# Patient Record
Sex: FEMALE | Race: WHITE | NOT HISPANIC OR LATINO | Employment: FULL TIME | URBAN - METROPOLITAN AREA
[De-identification: names, ages, dates, MRNs, and addresses within clinical notes are randomized per-mention and may not be internally consistent; named-entity substitution may affect disease eponyms.]

---

## 2019-01-16 LAB
EXTERNAL HEPATITIS B SURFACE ANTIGEN: NEGATIVE
EXTERNAL HEPATITIS C AB: NEGATIVE
EXTERNAL RUBELLA QUALITATIVE: NORMAL
EXTERNAL SYPHILIS RPR SCREEN: NEGATIVE
HIV1 P24 AG SERPL QL IA: NEGATIVE

## 2019-07-29 LAB — EXTERNAL GROUP B STREP ANTIGEN: NORMAL

## 2019-08-14 ENCOUNTER — PREP FOR SURGERY (OUTPATIENT)
Dept: OTHER | Facility: HOSPITAL | Age: 24
End: 2019-08-14

## 2019-08-14 RX ORDER — METHYLERGONOVINE MALEATE 0.2 MG/ML
200 INJECTION INTRAVENOUS ONCE AS NEEDED
Status: CANCELLED | OUTPATIENT
Start: 2019-08-14

## 2019-08-14 RX ORDER — ONDANSETRON 2 MG/ML
4 INJECTION INTRAMUSCULAR; INTRAVENOUS EVERY 6 HOURS PRN
Status: CANCELLED | OUTPATIENT
Start: 2019-08-14

## 2019-08-14 RX ORDER — IBUPROFEN 600 MG/1
600 TABLET ORAL EVERY 6 HOURS PRN
Status: CANCELLED | OUTPATIENT
Start: 2019-08-14

## 2019-08-14 RX ORDER — SODIUM CHLORIDE 0.9 % (FLUSH) 0.9 %
3-10 SYRINGE (ML) INJECTION AS NEEDED
Status: CANCELLED | OUTPATIENT
Start: 2019-08-14

## 2019-08-14 RX ORDER — ONDANSETRON 4 MG/1
4 TABLET, FILM COATED ORAL EVERY 6 HOURS PRN
Status: CANCELLED | OUTPATIENT
Start: 2019-08-14

## 2019-08-14 RX ORDER — ACETAMINOPHEN 325 MG/1
650 TABLET ORAL EVERY 4 HOURS PRN
Status: CANCELLED | OUTPATIENT
Start: 2019-08-14

## 2019-08-14 RX ORDER — LIDOCAINE HYDROCHLORIDE 10 MG/ML
5 INJECTION, SOLUTION EPIDURAL; INFILTRATION; INTRACAUDAL; PERINEURAL AS NEEDED
Status: CANCELLED | OUTPATIENT
Start: 2019-08-14

## 2019-08-14 RX ORDER — SODIUM CHLORIDE, SODIUM LACTATE, POTASSIUM CHLORIDE, CALCIUM CHLORIDE 600; 310; 30; 20 MG/100ML; MG/100ML; MG/100ML; MG/100ML
125 INJECTION, SOLUTION INTRAVENOUS CONTINUOUS
Status: CANCELLED | OUTPATIENT
Start: 2019-08-14

## 2019-08-14 RX ORDER — OXYTOCIN-SODIUM CHLORIDE 0.9% IV SOLN 30 UNIT/500ML 30-0.9/5 UT/ML-%
125 SOLUTION INTRAVENOUS CONTINUOUS PRN
Status: CANCELLED | OUTPATIENT
Start: 2019-08-14

## 2019-08-14 RX ORDER — SODIUM CHLORIDE 0.9 % (FLUSH) 0.9 %
3 SYRINGE (ML) INJECTION EVERY 12 HOURS SCHEDULED
Status: CANCELLED | OUTPATIENT
Start: 2019-08-14

## 2019-08-14 RX ORDER — MORPHINE SULFATE 4 MG/ML
4 INJECTION, SOLUTION INTRAMUSCULAR; INTRAVENOUS
Status: CANCELLED | OUTPATIENT
Start: 2019-08-14 | End: 2019-08-24

## 2019-08-14 RX ORDER — OXYTOCIN-SODIUM CHLORIDE 0.9% IV SOLN 30 UNIT/500ML 30-0.9/5 UT/ML-%
2 SOLUTION INTRAVENOUS
Status: CANCELLED | OUTPATIENT
Start: 2019-08-14

## 2019-08-14 RX ORDER — CARBOPROST TROMETHAMINE 250 UG/ML
250 INJECTION, SOLUTION INTRAMUSCULAR AS NEEDED
Status: CANCELLED | OUTPATIENT
Start: 2019-08-14

## 2019-08-14 RX ORDER — OXYTOCIN-SODIUM CHLORIDE 0.9% IV SOLN 30 UNIT/500ML 30-0.9/5 UT/ML-%
250 SOLUTION INTRAVENOUS CONTINUOUS
Status: CANCELLED | OUTPATIENT
Start: 2019-08-14 | End: 2019-08-14

## 2019-08-14 RX ORDER — MISOPROSTOL 200 UG/1
800 TABLET ORAL AS NEEDED
Status: CANCELLED | OUTPATIENT
Start: 2019-08-14

## 2019-08-14 RX ORDER — OXYTOCIN-SODIUM CHLORIDE 0.9% IV SOLN 30 UNIT/500ML 30-0.9/5 UT/ML-%
999 SOLUTION INTRAVENOUS ONCE
Status: CANCELLED | OUTPATIENT
Start: 2019-08-14 | End: 2019-08-14

## 2019-08-16 ENCOUNTER — ANESTHESIA EVENT (OUTPATIENT)
Dept: LABOR AND DELIVERY | Facility: HOSPITAL | Age: 24
End: 2019-08-16

## 2019-08-16 ENCOUNTER — ANESTHESIA (OUTPATIENT)
Dept: LABOR AND DELIVERY | Facility: HOSPITAL | Age: 24
End: 2019-08-16

## 2019-08-16 ENCOUNTER — HOSPITAL ENCOUNTER (OUTPATIENT)
Dept: LABOR AND DELIVERY | Facility: HOSPITAL | Age: 24
Discharge: HOME | End: 2019-08-16

## 2019-08-16 ENCOUNTER — HOSPITAL ENCOUNTER (INPATIENT)
Facility: HOSPITAL | Age: 24
LOS: 2 days | Discharge: HOME | End: 2019-08-18
Attending: OBSTETRICS & GYNECOLOGY | Admitting: OBSTETRICS & GYNECOLOGY

## 2019-08-16 PROBLEM — Z34.90 ENCOUNTER FOR ELECTIVE INDUCTION OF LABOR: Status: ACTIVE | Noted: 2019-08-16

## 2019-08-16 LAB
ABO GROUP BLD: NORMAL
AMPHET+METHAMPHET UR QL: NEGATIVE
BARBITURATES UR QL SCN: NEGATIVE
BASOPHILS # BLD AUTO: 0.1 10*3/MM3 (ref 0–0.2)
BASOPHILS NFR BLD AUTO: 1 % (ref 0–1.5)
BENZODIAZ UR QL SCN: NEGATIVE
BLD GP AB SCN SERPL QL: NEGATIVE
CANNABINOIDS SERPL QL: NEGATIVE
COCAINE UR QL: NEGATIVE
CREAT UR-MCNC: 97.2 MG/DL
DEPRECATED RDW RBC AUTO: 48.6 FL (ref 37–54)
EOSINOPHIL # BLD AUTO: 0.1 10*3/MM3 (ref 0–0.4)
EOSINOPHIL NFR BLD AUTO: 0.8 % (ref 0.3–6.2)
ERYTHROCYTE [DISTWIDTH] IN BLOOD BY AUTOMATED COUNT: 17 % (ref 12.3–15.4)
HCT VFR BLD AUTO: 30.3 % (ref 34–46.6)
HGB BLD-MCNC: 9.8 G/DL (ref 12–15.9)
HIV1+2 AB SER QL: NORMAL
LYMPHOCYTES # BLD AUTO: 2.3 10*3/MM3 (ref 0.7–3.1)
LYMPHOCYTES NFR BLD AUTO: 19.9 % (ref 19.6–45.3)
MCH RBC QN AUTO: 26 PG (ref 26.6–33)
MCHC RBC AUTO-ENTMCNC: 32.4 G/DL (ref 31.5–35.7)
MCV RBC AUTO: 80.3 FL (ref 79–97)
METHADONE UR QL SCN: NEGATIVE
MONOCYTES # BLD AUTO: 0.5 10*3/MM3 (ref 0.1–0.9)
MONOCYTES NFR BLD AUTO: 4.7 % (ref 5–12)
NEUTROPHILS # BLD AUTO: 8.5 10*3/MM3 (ref 1.7–7)
NEUTROPHILS NFR BLD AUTO: 73.6 % (ref 42.7–76)
NRBC BLD AUTO-RTO: 0 /100 WBC (ref 0–0.2)
OPIATES UR QL: NEGATIVE
PCP UR QL SCN: NEGATIVE
PLATELET # BLD AUTO: 257 10*3/MM3 (ref 140–450)
PMV BLD AUTO: 8.1 FL (ref 6–12)
RBC # BLD AUTO: 3.77 10*6/MM3 (ref 3.77–5.28)
RH BLD: POSITIVE
T PALLIDUM IGG SER QL: NORMAL
T&S EXPIRATION DATE: NORMAL
WBC NRBC COR # BLD: 11.5 10*3/MM3 (ref 3.4–10.8)

## 2019-08-16 PROCEDURE — 80307 DRUG TEST PRSMV CHEM ANLYZR: CPT | Performed by: OBSTETRICS & GYNECOLOGY

## 2019-08-16 PROCEDURE — G0432 EIA HIV-1/HIV-2 SCREEN: HCPCS | Performed by: OBSTETRICS & GYNECOLOGY

## 2019-08-16 PROCEDURE — 86850 RBC ANTIBODY SCREEN: CPT | Performed by: OBSTETRICS & GYNECOLOGY

## 2019-08-16 PROCEDURE — 86780 TREPONEMA PALLIDUM: CPT | Performed by: OBSTETRICS & GYNECOLOGY

## 2019-08-16 PROCEDURE — 3E0E77Z INTRODUCTION OF ELECTROLYTIC AND WATER BALANCE SUBSTANCE INTO PRODUCTS OF CONCEPTION, VIA NATURAL OR ARTIFICIAL OPENING: ICD-10-PCS | Performed by: OBSTETRICS & GYNECOLOGY

## 2019-08-16 PROCEDURE — 0KQM0ZZ REPAIR PERINEUM MUSCLE, OPEN APPROACH: ICD-10-PCS | Performed by: OBSTETRICS & GYNECOLOGY

## 2019-08-16 PROCEDURE — 25010000002 MORPHINE PER 10 MG: Performed by: OBSTETRICS & GYNECOLOGY

## 2019-08-16 PROCEDURE — 3E033VJ INTRODUCTION OF OTHER HORMONE INTO PERIPHERAL VEIN, PERCUTANEOUS APPROACH: ICD-10-PCS | Performed by: OBSTETRICS & GYNECOLOGY

## 2019-08-16 PROCEDURE — 88307 TISSUE EXAM BY PATHOLOGIST: CPT | Performed by: OBSTETRICS & GYNECOLOGY

## 2019-08-16 PROCEDURE — C1755 CATHETER, INTRASPINAL: HCPCS | Performed by: STUDENT IN AN ORGANIZED HEALTH CARE EDUCATION/TRAINING PROGRAM

## 2019-08-16 PROCEDURE — 82570 ASSAY OF URINE CREATININE: CPT | Performed by: OBSTETRICS & GYNECOLOGY

## 2019-08-16 PROCEDURE — 86900 BLOOD TYPING SEROLOGIC ABO: CPT | Performed by: OBSTETRICS & GYNECOLOGY

## 2019-08-16 PROCEDURE — 85027 COMPLETE CBC AUTOMATED: CPT | Performed by: OBSTETRICS & GYNECOLOGY

## 2019-08-16 PROCEDURE — 86900 BLOOD TYPING SEROLOGIC ABO: CPT

## 2019-08-16 PROCEDURE — 86901 BLOOD TYPING SEROLOGIC RH(D): CPT

## 2019-08-16 PROCEDURE — 86901 BLOOD TYPING SEROLOGIC RH(D): CPT | Performed by: OBSTETRICS & GYNECOLOGY

## 2019-08-16 PROCEDURE — 25010000002 ONDANSETRON PER 1 MG: Performed by: OBSTETRICS & GYNECOLOGY

## 2019-08-16 PROCEDURE — 10907ZC DRAINAGE OF AMNIOTIC FLUID, THERAPEUTIC FROM PRODUCTS OF CONCEPTION, VIA NATURAL OR ARTIFICIAL OPENING: ICD-10-PCS | Performed by: OBSTETRICS & GYNECOLOGY

## 2019-08-16 PROCEDURE — 25010000002 PENICILLIN G POTASSIUM PER 600000 UNITS: Performed by: OBSTETRICS & GYNECOLOGY

## 2019-08-16 RX ORDER — SODIUM CHLORIDE 0.9 % (FLUSH) 0.9 %
3 SYRINGE (ML) INJECTION EVERY 12 HOURS SCHEDULED
Status: DISCONTINUED | OUTPATIENT
Start: 2019-08-16 | End: 2019-08-16

## 2019-08-16 RX ORDER — PRENATAL VIT/IRON FUM/FOLIC AC 27MG-0.8MG
1 TABLET ORAL DAILY
Status: DISCONTINUED | OUTPATIENT
Start: 2019-08-17 | End: 2019-08-18 | Stop reason: HOSPADM

## 2019-08-16 RX ORDER — METHYLERGONOVINE MALEATE 0.2 MG/ML
200 INJECTION INTRAVENOUS ONCE AS NEEDED
Status: DISCONTINUED | OUTPATIENT
Start: 2019-08-16 | End: 2019-08-16 | Stop reason: HOSPADM

## 2019-08-16 RX ORDER — ACETAMINOPHEN 325 MG/1
650 TABLET ORAL EVERY 4 HOURS PRN
Status: DISCONTINUED | OUTPATIENT
Start: 2019-08-16 | End: 2019-08-16

## 2019-08-16 RX ORDER — MORPHINE SULFATE 4 MG/ML
4 INJECTION, SOLUTION INTRAMUSCULAR; INTRAVENOUS
Status: DISCONTINUED | OUTPATIENT
Start: 2019-08-16 | End: 2019-08-16

## 2019-08-16 RX ORDER — ONDANSETRON 4 MG/1
4 TABLET, FILM COATED ORAL EVERY 8 HOURS PRN
Status: DISCONTINUED | OUTPATIENT
Start: 2019-08-16 | End: 2019-08-18 | Stop reason: HOSPADM

## 2019-08-16 RX ORDER — ONDANSETRON 4 MG/1
4 TABLET, FILM COATED ORAL EVERY 6 HOURS PRN
Status: DISCONTINUED | OUTPATIENT
Start: 2019-08-16 | End: 2019-08-16

## 2019-08-16 RX ORDER — MULTIPLE VITAMINS W/ MINERALS TAB 9MG-400MCG
1 TAB ORAL DAILY
COMMUNITY
End: 2020-11-23

## 2019-08-16 RX ORDER — SODIUM CHLORIDE 0.9 % (FLUSH) 0.9 %
3-10 SYRINGE (ML) INJECTION AS NEEDED
Status: DISCONTINUED | OUTPATIENT
Start: 2019-08-16 | End: 2019-08-16

## 2019-08-16 RX ORDER — OXYTOCIN-SODIUM CHLORIDE 0.9% IV SOLN 30 UNIT/500ML 30-0.9/5 UT/ML-%
250 SOLUTION INTRAVENOUS CONTINUOUS
Status: ACTIVE | OUTPATIENT
Start: 2019-08-16 | End: 2019-08-16

## 2019-08-16 RX ORDER — OXYTOCIN-SODIUM CHLORIDE 0.9% IV SOLN 30 UNIT/500ML 30-0.9/5 UT/ML-%
2 SOLUTION INTRAVENOUS
Status: DISCONTINUED | OUTPATIENT
Start: 2019-08-16 | End: 2019-08-16

## 2019-08-16 RX ORDER — LANOLIN 100 %
OINTMENT (GRAM) TOPICAL
Status: DISCONTINUED | OUTPATIENT
Start: 2019-08-16 | End: 2019-08-18 | Stop reason: HOSPADM

## 2019-08-16 RX ORDER — CALCIUM CARBONATE 200(500)MG
2 TABLET,CHEWABLE ORAL 3 TIMES DAILY PRN
Status: DISCONTINUED | OUTPATIENT
Start: 2019-08-16 | End: 2019-08-18 | Stop reason: HOSPADM

## 2019-08-16 RX ORDER — DOCUSATE SODIUM 100 MG/1
100 CAPSULE, LIQUID FILLED ORAL DAILY
Status: DISCONTINUED | OUTPATIENT
Start: 2019-08-17 | End: 2019-08-18 | Stop reason: HOSPADM

## 2019-08-16 RX ORDER — CARBOPROST TROMETHAMINE 250 UG/ML
250 INJECTION, SOLUTION INTRAMUSCULAR AS NEEDED
Status: DISCONTINUED | OUTPATIENT
Start: 2019-08-16 | End: 2019-08-16 | Stop reason: HOSPADM

## 2019-08-16 RX ORDER — OXYTOCIN-SODIUM CHLORIDE 0.9% IV SOLN 30 UNIT/500ML 30-0.9/5 UT/ML-%
125 SOLUTION INTRAVENOUS CONTINUOUS PRN
Status: DISCONTINUED | OUTPATIENT
Start: 2019-08-16 | End: 2019-08-16 | Stop reason: HOSPADM

## 2019-08-16 RX ORDER — SIMETHICONE 80 MG
80 TABLET,CHEWABLE ORAL 4 TIMES DAILY PRN
Status: DISCONTINUED | OUTPATIENT
Start: 2019-08-16 | End: 2019-08-16

## 2019-08-16 RX ORDER — IBUPROFEN 600 MG/1
600 TABLET ORAL EVERY 6 HOURS PRN
Status: DISCONTINUED | OUTPATIENT
Start: 2019-08-16 | End: 2019-08-18 | Stop reason: HOSPADM

## 2019-08-16 RX ORDER — IBUPROFEN 600 MG/1
600 TABLET ORAL EVERY 6 HOURS PRN
Status: DISCONTINUED | OUTPATIENT
Start: 2019-08-16 | End: 2019-08-16 | Stop reason: HOSPADM

## 2019-08-16 RX ORDER — LIDOCAINE HYDROCHLORIDE 10 MG/ML
5 INJECTION, SOLUTION EPIDURAL; INFILTRATION; INTRACAUDAL; PERINEURAL AS NEEDED
Status: DISCONTINUED | OUTPATIENT
Start: 2019-08-16 | End: 2019-08-16

## 2019-08-16 RX ORDER — ACETAMINOPHEN 325 MG/1
650 TABLET ORAL EVERY 4 HOURS PRN
Status: DISCONTINUED | OUTPATIENT
Start: 2019-08-16 | End: 2019-08-16 | Stop reason: HOSPADM

## 2019-08-16 RX ORDER — FERROUS SULFATE 325(65) MG
325 TABLET ORAL
COMMUNITY
End: 2020-11-23

## 2019-08-16 RX ORDER — MISOPROSTOL 200 UG/1
800 TABLET ORAL AS NEEDED
Status: DISCONTINUED | OUTPATIENT
Start: 2019-08-16 | End: 2019-08-16 | Stop reason: HOSPADM

## 2019-08-16 RX ORDER — ONDANSETRON 2 MG/ML
4 INJECTION INTRAMUSCULAR; INTRAVENOUS EVERY 6 HOURS PRN
Status: DISCONTINUED | OUTPATIENT
Start: 2019-08-16 | End: 2019-08-16 | Stop reason: SDUPTHER

## 2019-08-16 RX ORDER — OXYTOCIN-SODIUM CHLORIDE 0.9% IV SOLN 30 UNIT/500ML 30-0.9/5 UT/ML-%
999 SOLUTION INTRAVENOUS ONCE
Status: DISCONTINUED | OUTPATIENT
Start: 2019-08-16 | End: 2019-08-16 | Stop reason: HOSPADM

## 2019-08-16 RX ORDER — ONDANSETRON 2 MG/ML
4 INJECTION INTRAMUSCULAR; INTRAVENOUS EVERY 6 HOURS PRN
Status: DISCONTINUED | OUTPATIENT
Start: 2019-08-16 | End: 2019-08-16

## 2019-08-16 RX ORDER — ONDANSETRON 4 MG/1
4 TABLET, FILM COATED ORAL EVERY 6 HOURS PRN
Status: DISCONTINUED | OUTPATIENT
Start: 2019-08-16 | End: 2019-08-16 | Stop reason: SDUPTHER

## 2019-08-16 RX ORDER — SODIUM CHLORIDE 0.9 % (FLUSH) 0.9 %
1-10 SYRINGE (ML) INJECTION AS NEEDED
Status: DISCONTINUED | OUTPATIENT
Start: 2019-08-16 | End: 2019-08-18 | Stop reason: HOSPADM

## 2019-08-16 RX ORDER — SODIUM CHLORIDE, SODIUM LACTATE, POTASSIUM CHLORIDE, CALCIUM CHLORIDE 600; 310; 30; 20 MG/100ML; MG/100ML; MG/100ML; MG/100ML
125 INJECTION, SOLUTION INTRAVENOUS CONTINUOUS
Status: DISCONTINUED | OUTPATIENT
Start: 2019-08-16 | End: 2019-08-16

## 2019-08-16 RX ADMIN — LIDOCAINE HYDROCHLORIDE 3 ML: 10; .005 INJECTION, SOLUTION EPIDURAL; INFILTRATION; INTRACAUDAL; PERINEURAL at 10:36

## 2019-08-16 RX ADMIN — BENZOCAINE 57 SPRAY: 11.4 AEROSOL, SPRAY TOPICAL at 14:36

## 2019-08-16 RX ADMIN — WITCH HAZEL 1 PAD: 500 SOLUTION RECTAL; TOPICAL at 14:36

## 2019-08-16 RX ADMIN — IBUPROFEN 600 MG: 600 TABLET, FILM COATED ORAL at 19:05

## 2019-08-16 RX ADMIN — OXYTOCIN 2 MILLI-UNITS/MIN: 10 INJECTION INTRAVENOUS at 06:12

## 2019-08-16 RX ADMIN — SODIUM CHLORIDE 5 MILLION UNITS: 900 INJECTION, SOLUTION INTRAVENOUS at 06:11

## 2019-08-16 RX ADMIN — SODIUM CHLORIDE 2.5 MILLION UNITS: 900 INJECTION INTRAVENOUS at 09:55

## 2019-08-16 RX ADMIN — MORPHINE SULFATE 4 MG: 4 INJECTION INTRAVENOUS at 08:51

## 2019-08-16 RX ADMIN — Medication: at 14:36

## 2019-08-16 RX ADMIN — Medication 3 ML: at 10:15

## 2019-08-16 RX ADMIN — Medication 8 ML/HR: at 10:40

## 2019-08-16 RX ADMIN — ONDANSETRON 4 MG: 2 INJECTION INTRAMUSCULAR; INTRAVENOUS at 09:02

## 2019-08-16 RX ADMIN — SODIUM CHLORIDE, SODIUM LACTATE, POTASSIUM CHLORIDE, AND CALCIUM CHLORIDE 125 ML/HR: 600; 310; 30; 20 INJECTION, SOLUTION INTRAVENOUS at 06:11

## 2019-08-16 NOTE — ANESTHESIA PROCEDURE NOTES
Labor Epidural      Patient reassessed immediately prior to procedure    Patient location during procedure: OB  Start Time: 8/16/2019 10:26 AM  Indication:at surgeon's request  Performed By  Anesthesiologist: Joao Gunn MD  Preanesthetic Checklist  Completed: patient identified, site marked, surgical consent, pre-op evaluation, timeout performed, IV checked, risks and benefits discussed and monitors and equipment checked  Additional Notes  Tolerated well   Prep:  Pt Position:sitting  Sterile Tech:gloves, cap and sterile barrier  Prep:chlorhexidine gluconate and isopropyl alcohol  Monitoring:continuous pulse oximetry, EKG and blood pressure monitoring  Epidural Block Procedure:  Approach:midline  Guidance:landmark technique and palpation technique  Location:L3-L4  Needle Type:Tuohy  Needle Gauge:19 G  Loss of Resistance Medium: air  Loss of Resistance: 5cm  Cath Depth at skin:10 cm  Paresthesia: none  Aspiration:negative  Test Dose:negative  Number of Attempts: 1  Post Assessment:  Dressing:secured with tape and occlusive dressing applied  Pt Tolerance:patient tolerated the procedure well with no apparent complications  Complications:no

## 2019-08-16 NOTE — ANESTHESIA PREPROCEDURE EVALUATION
Anesthesia Evaluation     Patient summary reviewed and Nursing notes reviewed   no history of anesthetic complications:  NPO Solid Status: > 8 hours  NPO Liquid Status: > 2 hours           Airway   Mallampati: II  TM distance: >3 FB  Neck ROM: full  No difficulty expected  Dental - normal exam     Pulmonary - negative pulmonary ROS and normal exam   Cardiovascular - negative cardio ROS and normal exam        Neuro/Psych- negative ROS  GI/Hepatic/Renal/Endo - negative ROS     Musculoskeletal (-) negative ROS    Abdominal  - normal exam   Substance History - negative use     OB/GYN    (+) Pregnant,         Other - negative ROS                       Anesthesia Plan    ASA 2     epidural     Anesthetic plan, all risks, benefits, and alternatives have been provided, discussed and informed consent has been obtained with: patient.

## 2019-08-16 NOTE — H&P
ALLEGRA Mckeon  Obstetric History and Physical     Chief Complaint: IOL at term    Subjective     Patient is a 24 y.o. female  currently at 39w1d, who presents for IOL at term.    Her prenatal care is c/b lapse in [facility] (moved to [place] then moved back), MJ use, anemia GBS positive.      Prenatal Information:  External Prenatal Results     Pregnancy Outside Results - Transcribed From Office Records - See Scanned Records For Details     Test Value Date Time    Hgb 9.8 g/dL 19 0625    Hct 30.3 % 19 0625    ABO A  19 0625    Rh Positive  19 0625    Antibody Screen Negative  19 0625    Glucose Fasting GTT       Glucose Tolerance Test 1 hour       Glucose Tolerance Test 3 hour       Gonorrhea (discrete)       Chlamydia (discrete)       RPR Negative  19     VDRL       Syphilis Antibody       Rubella Immune  19     HBsAg Negative  19     Herpes Simplex Virus PCR       Herpes Simplex VIrus Culture       HIV Negative  19     Hep C RNA Quant PCR       Hep C Antibody Negative  19     AFP       Group B Strep POS  19     GBS Susceptibility to Clindamycin       GBS Susceptibility to Erythromycin       Fetal Fibronectin       Genetic Testing, Maternal Blood             Drug Screening     Test Value Date Time    Urine Drug Screen       Amphetamine Screen Negative  19 0537    Barbiturate Screen Negative  19 0537    Benzodiazepine Screen Negative  19 0537    Methadone Screen Negative  19 0537    Phencyclidine Screen Negative  19 0537    Opiates Screen Negative  19 0537    THC Screen Negative  19 0537    Cocaine Screen       Propoxyphene Screen       Buprenorphine Screen       Methamphetamine Screen       Oxycodone Screen       Tricyclic Antidepressants Screen                    Past OB History:    : SAB   G2: 10/9/16-TSVD, 7-14, no comp         Past Medical History: Past Medical History:   Diagnosis Date   • Chlamydia     • Substance abuse (CMS/[facility])     vinayuna use   • Trauma         Past Surgical History History reviewed. No pertinent surgical history.     Family History: No family history on file.   Social History:  reports that she quit smoking about 7 months ago. She has never used smokeless tobacco.   reports that she does not drink alcohol.   reports that she uses drugs. Drug: Marijuana.        General ROS: Pertinent items are noted in HPI    Objective      Vitals:     Vitals:    08/16/19 0700 08/16/19 0730 08/16/19 0800 08/16/19 0830   BP: 101/50 91/55 96/66 102/79   Pulse: 90 86 88 85   Resp:       Temp:       TempSrc:       SpO2:       Weight:       Height:           Fetal Heart Rate Assessment:   120, mod variability    [place]:   Every 2-3 min     Physical Exam:     General Appearance:    Alert, cooperative, in no acute distress   Abdomen:     Soft, non-tender, EFW 7 1/2 lbs   Pelvic Exam:    Presentation: vtx    Cervix: 2/75/-1, pelvis clinically adequate, proven to 7-14   Extremities:   Moves all extremities well   Skin:   No bleeding, bruising or rash         Laboratory Results:   Lab Results (last 48 hours)     Procedure Component Value Units Date/Time    CBC & Differential [[id number]] Collected:  08/16/19 0625    Specimen:  Blood Updated:  08/16/19 0647    Narrative:       The following orders were created for panel order CBC & Differential.  Procedure                               Abnormality         Status                     ---------                               -----------         ------                     Manual Differential[[id number]]                                                         CBC Auto Differential[[id number]]        Abnormal            Final result                 Please view results for these tests on the individual orders.    CBC Auto Differential [[id number]]  (Abnormal) Collected:  08/16/19 0625    Specimen:  Blood Updated:  08/16/19 0647     WBC 11.50 10*3/mm3      RBC 3.77 10*6/mm3       Hemoglobin 9.8 g/dL      Hematocrit 30.3 %      MCV 80.3 fL      MCH 26.0 pg      MCHC 32.4 g/dL      RDW 17.0 %      RDW-SD 48.6 fl      MPV 8.1 fL      Platelets 257 10*3/mm3      Neutrophil % 73.6 %      Lymphocyte % 19.9 %      Monocyte % 4.7 %      Eosinophil % 0.8 %      Basophil % 1.0 %      Neutrophils, Absolute 8.50 10*3/mm3      Lymphocytes, Absolute 2.30 10*3/mm3      Monocytes, Absolute 0.50 10*3/mm3      Eosinophils, Absolute 0.10 10*3/mm3      Basophils, Absolute 0.10 10*3/mm3      nRBC 0.0 /100 WBC     Rubella Antibody, IgG [[id number]] Resulted:  01/16/19     Specimen:  Blood Updated:  08/16/19 0639     External Rubella Qual Immune    Hepatitis C Antibody [[id number]] Resulted:  01/16/19     Specimen:  Blood Updated:  08/16/19 0639     External Hepatitis C Ab Negative    Hepatitis B Surface Antigen [[id number]] Resulted:  01/16/19     Specimen:  Blood Updated:  08/16/19 0639     External Hepatitis B Surface Ag Negative    RPR [[id number]] Resulted:  01/16/19     Specimen:  Blood Updated:  08/16/19 0639     External RPR Negative    HIV-1 Antibody, EIA [[id number]] Resulted:  01/16/19     Specimen:  Blood Updated:  08/16/19 0639     External HIV Antibody Negative    Group B Streptococcus Culture - Swab, Vaginal/Rectum [[id number]] Resulted:  07/29/19     Specimen:  Swab from Vaginal/Rectum Updated:  08/16/19 0638     External Strep Group B Ag POS    HIV-1 & HIV-2 Antibodies [[id number]] Collected:  08/16/19 0625    Specimen:  Blood Updated:  08/16/19 0633    Narrative:       The following orders were created for panel order HIV-1 & HIV-2 Antibodies.  Procedure                               Abnormality         Status                     ---------                               -----------         ------                     HIV-1 & HIV-2 Antibodies[[id number]]                         In process                   Please view results for these tests on the individual orders.    HIV-1 & HIV-2 Antibodies [[id number]]  Collected:  08/16/19 0625    Specimen:  Blood Updated:  08/16/19 0633    T Pallidum Antibody (FTA-Ab) [[id number]] Collected:  08/16/19 0625    Specimen:  Blood Updated:  08/16/19 0633    Urine Drug Screen - [[id number]] Collected:  08/16/19 0537    Specimen:  Urine Updated:  08/16/19 0623     Barbiturates Screen, Urine Negative     Benzodiazepine Screen, Urine Negative     Cocaine Screen, Urine Negative     Opiate Screen Negative     THC, Screen, Urine Negative     Methadone Screen, Urine Negative     Amphetamine Screen, Urine Negative     Creatinine, Urine 97.2 mg/dL      Phencyclidine (PCP), Urine Negative    Narrative:       All urine drugs of abuse requests without chain of custody are for medical screening purposes only.  False positives are possible.               Assessment/Plan     Active Problems:    Encounter for elective induction of labor         Assessment:  1.  Intrauterine pregnancy at 39w1d gestation with reassuring fetal status.    2.  IOL at term  3.  GBS status:   External Strep Group B Ag   Date Value Ref Range Status   07/29/2019 POS  Final       Plan:  1. Vaginal anticipated  2. Plan of care has been reviewed with patient.  3.  Risks, benefits of treatment plan have been discussed.  4.  All questions have been answered.  5.  PCN G for GBS prophylaxis       Veronica Smith MD   8/16/2019   9:03 AM

## 2019-08-16 NOTE — PLAN OF CARE
Problem: Patient Care Overview  Goal: Plan of Care Review   08/16/19 0659   Coping/Psychosocial   Plan of Care Reviewed With patient;spouse   OTHER   Outcome Summary Pt comfortable at this time. Family at bedside       Problem: Labor (Cervical Ripen, Induct, Augment) (Adult,Obstetrics,Pediatric)  Goal: Signs and Symptoms of Listed Potential Problems Will be Absent, Minimized or Managed (Labor)  Outcome: Ongoing (interventions implemented as appropriate)

## 2019-08-16 NOTE — PROGRESS NOTES
" Mike  Obstetric Progress Note    Subjective   Having painful contractions    Objective     Vitals:  Vitals:    08/16/19 0730 08/16/19 0800 08/16/19 0830 08/16/19 0900   BP: 91/55 96/66 102/79 110/69   Pulse: 86 88 85 93   Resp:       Temp:       TempSrc:       SpO2:       Weight:       Height:         Flowsheet Rows      First Filed Value   Admission Height  167.6 cm (66\") Documented at 08/16/2019 0652   Admission Weight  88.8 kg (195 lb 12.3 oz) Documented at 08/16/2019 0652        No intake or output data in the 24 hours ending 08/16/19 1006    Fetal Heart Rate Assessment:   125, mod variability  [place]:  Every 2-3 min    Physical Exam:  General: Patient is in no acute distress    Pelvic Exam: 4/90/-1, AROM, thin meconium, IUPC and FSE placed without difficulty            Assessment/Plan     Active Problems:    Encounter for elective induction of labor         Assessment:  1.  Intrauterine pregnancy at 39w1d gestation with reactive fetal status.    2.  labor  with ROM  3.  Thin meconium  4.  GBS status:   External Strep Group B Ag   Date Value Ref Range Status   07/29/2019 POS  Final       Plan:  1. Vaginal anticipated  2. Amnioinfusion 500 cc      Veronica Smith MD  8/16/2019  10:06 AM      "

## 2019-08-16 NOTE — L&D DELIVERY NOTE
Mike  Vaginal Delivery Note    Pre-delivery diagnosis     1. 24 y.o.  at 39w1d  2. IOL at term  3. Thin meconium    Post-delivery diagnosis  Same      Delivery     Delivery:  Spontaneous Vaginal Delivery    Date of Delivery:  2019   Anesthesia: epidural    Delivering clinician: Veronica Smith MD      Patient presented for IOL at term. Her cervix was 3 cm dilated. She was started on pitocin and had an AROM after antibiotics were administered for GBS prophylaxis. There was thin meconium. She had an amnioinfusion. An epidural was placed. She progressed to C/C/+2.    She pushed with one contraction with excellent maternal effort. The head delivered in OA, then the shoulders and remainder delivered without difficulty. L shoulder anterior. The mouth and nose were suctioned. There was good cry, color, tone and movement of all extremities. The infant was placed on the mother's chest and abdomen after the cord was clamped and cut (short cord). Cord gasses and blood were drawn. The placenta delivered spontaneously, intact and with a 3 vessel cord. The uterus, cervix and vagina was explored. There was a small 2nd degree midline laceration. Repaired with 3.0 monocryl. Good cosmesis and hemostasis noted. Sponge and needle counts correct. The patient and infant were left to recover in L&D.    Infant    Findings: female  infant       Apgars:   6   @ 1 minute /   9   @ 5 minutes         VB.356/-3.7    Placenta, Cord, and Fluid    Placenta delivered  spontaneous  3VC          Lacerations       2nd degree     Estimated Blood Loss 350 mL     Complications  none    Disposition  Mother to Mother Baby/Postpartum  in stable condition.  Baby remains with mom  in stable condition.      Veronica Smith MD  19  12:47 PM

## 2019-08-17 LAB
BASOPHILS # BLD AUTO: 0.1 10*3/MM3 (ref 0–0.2)
BASOPHILS NFR BLD AUTO: 0.4 % (ref 0–1.5)
DEPRECATED RDW RBC AUTO: 46.8 FL (ref 37–54)
EOSINOPHIL # BLD AUTO: 0.1 10*3/MM3 (ref 0–0.4)
EOSINOPHIL NFR BLD AUTO: 0.5 % (ref 0.3–6.2)
ERYTHROCYTE [DISTWIDTH] IN BLOOD BY AUTOMATED COUNT: 16.5 % (ref 12.3–15.4)
HCT VFR BLD AUTO: 28.1 % (ref 34–46.6)
HGB BLD-MCNC: 9 G/DL (ref 12–15.9)
LYMPHOCYTES # BLD AUTO: 2.3 10*3/MM3 (ref 0.7–3.1)
LYMPHOCYTES NFR BLD AUTO: 19.5 % (ref 19.6–45.3)
MCH RBC QN AUTO: 25.7 PG (ref 26.6–33)
MCHC RBC AUTO-ENTMCNC: 32.2 G/DL (ref 31.5–35.7)
MCV RBC AUTO: 79.7 FL (ref 79–97)
MONOCYTES # BLD AUTO: 0.7 10*3/MM3 (ref 0.1–0.9)
MONOCYTES NFR BLD AUTO: 5.6 % (ref 5–12)
NEUTROPHILS # BLD AUTO: 8.7 10*3/MM3 (ref 1.7–7)
NEUTROPHILS NFR BLD AUTO: 74 % (ref 42.7–76)
NRBC BLD AUTO-RTO: 0 /100 WBC (ref 0–0.2)
PLATELET # BLD AUTO: 220 10*3/MM3 (ref 140–450)
PMV BLD AUTO: 8.5 FL (ref 6–12)
RBC # BLD AUTO: 3.52 10*6/MM3 (ref 3.77–5.28)
WBC NRBC COR # BLD: 11.8 10*3/MM3 (ref 3.4–10.8)

## 2019-08-17 PROCEDURE — 85025 COMPLETE CBC W/AUTO DIFF WBC: CPT | Performed by: OBSTETRICS & GYNECOLOGY

## 2019-08-17 PROCEDURE — 90715 TDAP VACCINE 7 YRS/> IM: CPT | Performed by: OBSTETRICS & GYNECOLOGY

## 2019-08-17 PROCEDURE — 90471 IMMUNIZATION ADMIN: CPT | Performed by: OBSTETRICS & GYNECOLOGY

## 2019-08-17 PROCEDURE — 25010000002 TDAP 5-2.5-18.5 LF-MCG/0.5 SUSPENSION: Performed by: OBSTETRICS & GYNECOLOGY

## 2019-08-17 RX ORDER — HYDROCODONE BITARTRATE AND ACETAMINOPHEN 5; 325 MG/1; MG/1
1 TABLET ORAL EVERY 4 HOURS PRN
Status: DISCONTINUED | OUTPATIENT
Start: 2019-08-17 | End: 2019-08-18 | Stop reason: HOSPADM

## 2019-08-17 RX ADMIN — PRENATAL VIT W/ FE FUMARATE-FA TAB 27-0.8 MG 1 TABLET: 27-0.8 TAB at 09:57

## 2019-08-17 RX ADMIN — DOCUSATE SODIUM 100 MG: 100 CAPSULE, LIQUID FILLED ORAL at 09:57

## 2019-08-17 RX ADMIN — TETANUS TOXOID, REDUCED DIPHTHERIA TOXOID AND ACELLULAR PERTUSSIS VACCINE, ADSORBED 0.5 ML: 5; 2.5; 8; 8; 2.5 SUSPENSION INTRAMUSCULAR at 12:59

## 2019-08-17 RX ADMIN — IBUPROFEN 600 MG: 600 TABLET, FILM COATED ORAL at 01:04

## 2019-08-17 RX ADMIN — IBUPROFEN 600 MG: 600 TABLET, FILM COATED ORAL at 19:53

## 2019-08-17 RX ADMIN — HYDROCODONE BITARTRATE AND ACETAMINOPHEN 1 TABLET: 5; 325 TABLET ORAL at 16:47

## 2019-08-17 RX ADMIN — IBUPROFEN 600 MG: 600 TABLET, FILM COATED ORAL at 07:05

## 2019-08-17 RX ADMIN — IBUPROFEN 600 MG: 600 TABLET, FILM COATED ORAL at 12:59

## 2019-08-17 NOTE — PROGRESS NOTES
ALLEGRA Mckeon  Postpartum Note    Subjective   Postpartum Day 1:  Spontaneous Vaginal Delivery    Patient without complaints. Her pain is well controlled with nonsteroidal anti-inflammatory drugs. She is ambulating well.  Patient describes her bleeding as thin lochia.    Breastfeeding: without difficulty.    Objective     Vitals:  Vitals:    08/16/19 2305 08/17/19 0300 08/17/19 0721 08/17/19 1110   BP: (!) 87/51 100/62 100/65 101/53   BP Location: Left arm Left arm     Patient Position: Lying Lying     Pulse: 74 65 70 76   Resp: 16 16 16 16   Temp: 98.7 °F (37.1 °C) 97.7 °F (36.5 °C) 98.2 °F (36.8 °C) 98.7 °F (37.1 °C)   TempSrc: Oral Oral     SpO2: 99% 98% 98% 97%   Weight:       Height:           Physical Exam:  General:  no acute distress.  Abdomen: abdomen is soft without significant tenderness, masses, organomegaly or guarding. Fundus: appropriate, firm, non tender  Incision: N/C  Extremities: normal,  trace edema.     Labs:  Lab Results (last 72 hours)     Procedure Component Value Units Date/Time    CBC & Differential [[id number]] Collected:  08/17/19 0522    Specimen:  Blood Updated:  08/17/19 0619    Narrative:       The following orders were created for panel order CBC & Differential.  Procedure                               Abnormality         Status                     ---------                               -----------         ------                     CBC Auto Differential[[id number]]        Abnormal            Final result                 Please view results for these tests on the individual orders.    CBC Auto Differential [[id number]]  (Abnormal) Collected:  08/17/19 0522    Specimen:  Blood Updated:  08/17/19 0619     WBC 11.80 10*3/mm3      RBC 3.52 10*6/mm3      Hemoglobin 9.0 g/dL      Hematocrit 28.1 %      MCV 79.7 fL      MCH 25.7 pg      MCHC 32.2 g/dL      RDW 16.5 %      RDW-SD 46.8 fl      MPV 8.5 fL      Platelets 220 10*3/mm3      Neutrophil % 74.0 %      Lymphocyte % 19.5 %      Monocyte %  5.6 %      Eosinophil % 0.5 %      Basophil % 0.4 %      Neutrophils, Absolute 8.70 10*3/mm3      Lymphocytes, Absolute 2.30 10*3/mm3      Monocytes, Absolute 0.70 10*3/mm3      Eosinophils, Absolute 0.10 10*3/mm3      Basophils, Absolute 0.10 10*3/mm3      nRBC 0.0 /100 WBC     Tissue Pathology Exam [[id number]] Updated:  08/17/19 0504    Specimen:  Tissue from Placenta     HIV-1 & HIV-2 Antibodies [[id number]] Collected:  08/16/19 0625    Specimen:  Blood Updated:  08/16/19 1107    Narrative:       The following orders were created for panel order HIV-1 & HIV-2 Antibodies.  Procedure                               Abnormality         Status                     ---------                               -----------         ------                     HIV-1 & HIV-2 Antibodies[[id number]]     Normal              Final result                 Please view results for these tests on the individual orders.    HIV-1 & HIV-2 Antibodies [[id number]]  (Normal) Collected:  08/16/19 0625    Specimen:  Blood Updated:  08/16/19 1107     HIV-1/ HIV-2 Non-Reactive     Comment: A non-reactive test result does not preclude the possibility of exposure to HIV or infection with HIV. An antibody response to recent exposure may take several months to reach detectable levels.       Narrative:       The HIV antibody/antigen combo assay is a qualitative assay for HIV that includes the p24 antigen as well as antibodies to HIV types 1 and 2. This test is intended to be used as a screening assay in the diagnosis of HIV infection in patients over the age of 2.    T Pallidum Antibody (FTA-Ab) [[id number]]  (Normal) Collected:  08/16/19 0625    Specimen:  Blood Updated:  08/16/19 1037     TREPONEMAL AB IGG Non-Reactive    CBC & Differential [[id number]] Collected:  08/16/19 0625    Specimen:  Blood Updated:  08/16/19 0647    Narrative:       The following orders were created for panel order CBC & Differential.  Procedure                                Abnormality         Status                     ---------                               -----------         ------                     Manual Differential[[id number]]                                                         CBC Auto Differential[[id number]]        Abnormal            Final result                 Please view results for these tests on the individual orders.    CBC Auto Differential [[id number]]  (Abnormal) Collected:  08/16/19 0625    Specimen:  Blood Updated:  08/16/19 0647     WBC 11.50 10*3/mm3      RBC 3.77 10*6/mm3      Hemoglobin 9.8 g/dL      Hematocrit 30.3 %      MCV 80.3 fL      MCH 26.0 pg      MCHC 32.4 g/dL      RDW 17.0 %      RDW-SD 48.6 fl      MPV 8.1 fL      Platelets 257 10*3/mm3      Neutrophil % 73.6 %      Lymphocyte % 19.9 %      Monocyte % 4.7 %      Eosinophil % 0.8 %      Basophil % 1.0 %      Neutrophils, Absolute 8.50 10*3/mm3      Lymphocytes, Absolute 2.30 10*3/mm3      Monocytes, Absolute 0.50 10*3/mm3      Eosinophils, Absolute 0.10 10*3/mm3      Basophils, Absolute 0.10 10*3/mm3      nRBC 0.0 /100 WBC     Rubella Antibody, IgG [[id number]] Resulted:  01/16/19     Specimen:  Blood Updated:  08/16/19 0639     External Rubella Qual Immune    Hepatitis C Antibody [[id number]] Resulted:  01/16/19     Specimen:  Blood Updated:  08/16/19 0639     External Hepatitis C Ab Negative    Hepatitis B Surface Antigen [[id number]] Resulted:  01/16/19     Specimen:  Blood Updated:  08/16/19 0639     External Hepatitis B Surface Ag Negative    RPR [[id number]] Resulted:  01/16/19     Specimen:  Blood Updated:  08/16/19 0639     External RPR Negative    HIV-1 Antibody, EIA [[id number]] Resulted:  01/16/19     Specimen:  Blood Updated:  08/16/19 0639     External HIV Antibody Negative    Group B Streptococcus Culture - Swab, Vaginal/Rectum [[id number]] Resulted:  07/29/19     Specimen:  Swab from Vaginal/Rectum Updated:  08/16/19 0638     External Strep Group B Ag POS    Urine Drug Screen -  [[id number]] Collected:  08/16/19 0537    Specimen:  Urine Updated:  08/16/19 0623     Barbiturates Screen, Urine Negative     Benzodiazepine Screen, Urine Negative     Cocaine Screen, Urine Negative     Opiate Screen Negative     THC, Screen, Urine Negative     Methadone Screen, Urine Negative     Amphetamine Screen, Urine Negative     Creatinine, Urine 97.2 mg/dL      Phencyclidine (PCP), Urine Negative    Narrative:       All urine drugs of abuse requests without chain of custody are for medical screening purposes only.  False positives are possible.             Feeding method: Breastfeeding Status: Yes     Blood Type: RH Positive        Assessment/Plan     Active Problems:    Encounter for elective induction of labor      Jewell Brayan is Day 1  post-partum from a  Spontaneous Vaginal Delivery      Plan:  Continue current care.      Kailey Hoskins MD  8/17/2019  1:01 PM

## 2019-08-17 NOTE — PLAN OF CARE
Problem: Patient Care Overview  Goal: Plan of Care Review  Outcome: Ongoing (interventions implemented as appropriate)   08/17/19 6913   Coping/Psychosocial   Plan of Care Reviewed With patient   OTHER   Outcome Summary Patient is controlling pain with Motrin, ambulating well, and reports infant is breastfeeing well. Family is present at bedside and attentive to patient.   Plan of Care Review   Progress improving       Problem: Postpartum (Vaginal Delivery) (Adult,Obstetrics,Pediatric)  Goal: Signs and Symptoms of Listed Potential Problems Will be Absent, Minimized or Managed (Postpartum)  Outcome: Ongoing (interventions implemented as appropriate)

## 2019-08-17 NOTE — LACTATION NOTE
This note was copied from a baby's chart.  Provided mother with handouts, and hydrogel pads. Basic teaching done. Denies history of breast surgery. Denies use of routine medications. Denies wool allergy. Has a Spectra breastpump and a Motiv. Uses WIC in [facility]. Mother states she  1st child [id number], then it was too sore to breastfeed, she pumped and fed for 7mos. She did find out that her first child had a tongue and upper lip tie. Currently watching the breastfeeding DVD. Encouraged to call for feeding observation.

## 2019-08-17 NOTE — PLAN OF CARE
Problem: Patient Care Overview  Goal: Individualization and Mutuality  Outcome: Ongoing (interventions implemented as appropriate)      Problem: Postpartum (Vaginal Delivery) (Adult,Obstetrics,Pediatric)  Goal: Signs and Symptoms of Listed Potential Problems Will be Absent, Minimized or Managed (Postpartum)  Outcome: Ongoing (interventions implemented as appropriate)

## 2019-08-18 VITALS
BODY MASS INDEX: 31.46 KG/M2 | HEIGHT: 66 IN | DIASTOLIC BLOOD PRESSURE: 69 MMHG | OXYGEN SATURATION: 96 % | HEART RATE: 66 BPM | RESPIRATION RATE: 16 BRPM | TEMPERATURE: 98.3 F | WEIGHT: 195.77 LBS | SYSTOLIC BLOOD PRESSURE: 106 MMHG

## 2019-08-18 PROBLEM — Z34.90 ENCOUNTER FOR ELECTIVE INDUCTION OF LABOR: Status: RESOLVED | Noted: 2019-08-16 | Resolved: 2019-08-18

## 2019-08-18 RX ORDER — HYDROCODONE BITARTRATE AND ACETAMINOPHEN 5; 325 MG/1; MG/1
1 TABLET ORAL EVERY 4 HOURS PRN
Qty: 18 TABLET | Refills: 0 | Status: SHIPPED | OUTPATIENT
Start: 2019-08-18 | End: 2019-08-27

## 2019-08-18 RX ORDER — IBUPROFEN 600 MG/1
600 TABLET ORAL EVERY 6 HOURS PRN
Qty: 30 TABLET | Refills: 1 | Status: SHIPPED | OUTPATIENT
Start: 2019-08-18 | End: 2020-11-23

## 2019-08-18 RX ADMIN — HYDROCODONE BITARTRATE AND ACETAMINOPHEN 1 TABLET: 5; 325 TABLET ORAL at 02:01

## 2019-08-18 RX ADMIN — PRENATAL VIT W/ FE FUMARATE-FA TAB 27-0.8 MG 1 TABLET: 27-0.8 TAB at 09:44

## 2019-08-18 RX ADMIN — IBUPROFEN 600 MG: 600 TABLET, FILM COATED ORAL at 13:10

## 2019-08-18 RX ADMIN — IBUPROFEN 600 MG: 600 TABLET, FILM COATED ORAL at 05:41

## 2019-08-18 RX ADMIN — DOCUSATE SODIUM 100 MG: 100 CAPSULE, LIQUID FILLED ORAL at 09:00

## 2019-08-18 RX ADMIN — HYDROCODONE BITARTRATE AND ACETAMINOPHEN 1 TABLET: 5; 325 TABLET ORAL at 13:10

## 2019-08-18 RX ADMIN — WITCH HAZEL 1 PAD: 500 SOLUTION RECTAL; TOPICAL at 09:45

## 2019-08-18 NOTE — PLAN OF CARE
Problem: Patient Care Overview  Goal: Plan of Care Review  Outcome: Ongoing (interventions implemented as appropriate)   08/18/19 0456   Coping/Psychosocial   Plan of Care Reviewed With patient   OTHER   Outcome Summary Patient reports that pain is better controlled now that she has been taking the Norco. Patient was assisted with the set up of a sitz bath and patient performed sitz while infant was being assessed. Family is present and attentive to patient and her needs. Patient reports that breastfeeding is going well.   Plan of Care Review   Progress improving       Problem: Postpartum (Vaginal Delivery) (Adult,Obstetrics,Pediatric)  Goal: Signs and Symptoms of Listed Potential Problems Will be Absent, Minimized or Managed (Postpartum)  Outcome: Ongoing (interventions implemented as appropriate)  Patient was assisted with setting up a sitz bath today to help provide comfort.  Patient is also taking pain medication for pain, and reports better results with the change in pain medication.   08/18/19 0454   Goal/Outcome Evaluation   Problems Assessed (Postpartum Vaginal Delivery) all   Problems Present (Postpartum Vag Deliv) pain

## 2019-08-18 NOTE — LACTATION NOTE
This note was copied from a baby's chart.  Mother reports feedings are going very well. Feeding observation by Sandy ROMO yesterday. Declines feeding observation today, verbalizes happiness with how well feedings are going. Breasts filling. Nipples nontender. States she has read thru info packet. Denies lactation needs at this time. Provided with 's discharge weight ticket and lactation contact card. Encouraged to call as needed.

## 2019-08-18 NOTE — DISCHARGE SUMMARY
[facility]  Delivery Discharge Summary    Primary OB Clinician: Veronica Smith MD    Admission Diagnosis:  Active Problems:    * No active hospital problems. *  1.  T IUP  2.  Desires elective induction    Discharge Diagnosis:  Same    Gestational Age: 39w1d    Date of Delivery: 2019     Delivered By:  Veronica Smith     Delivery Type: Vaginal, Spontaneous      Tubal Ligation: n/a    Intrapartum Course: Uncomplicated delivery.     Postpartum Course:  Uncomplicated pp course.  Patient requiring Norco for pain relief.  She is also taking Motrin.  She is breast-feeding well.  She is afebrile and tolerating a regular diet.  She is anemic so she will be continued on iron twice daily.  To follow-up in 6 weeks or as needed    Physical Exam:    Vitals:   Vitals:    19 1110 19 1520 19 2213 19 0725   BP: 101/53 94/59 98/62 106/69   BP Location:   Left arm    Patient Position:   Lying    Pulse: 76 65 69 66   Resp: 16 16 14 16   Temp: 98.7 °F (37.1 °C) 97.6 °F (36.4 °C) 97.9 °F (36.6 °C) 98.3 °F (36.8 °C)   TempSrc:   Oral    SpO2: 97% 99% 98% 96%   Weight:       Height:         Temp (24hrs), Av.9 °F (36.6 °C), Min:97.6 °F (36.4 °C), Max:98.3 °F (36.8 °C)      General Appearance:    Alert, cooperative, in no acute distress   Abdomen:     Soft non-tender, non-distended, no guarding, no rebound         tenderness.   Extremities:   Moves all extremities well, no edema, no cyanosis, no              Redness.   Incision:  N/C   Fundus:   Firm, below umbilicus     Feeding method: Breastfeeding Status: Yes    Blood Type: RH Positive      Plan:  Discharge to home.    Follow-up appointment with Dr. Smith in 6 weeks.

## 2019-08-19 LAB
LAB AP CASE REPORT: NORMAL
PATH REPORT.FINAL DX SPEC: NORMAL
PATH REPORT.GROSS SPEC: NORMAL

## 2021-07-18 ENCOUNTER — PREP FOR SURGERY (OUTPATIENT)
Dept: OTHER | Facility: HOSPITAL | Age: 26
End: 2021-07-18

## 2021-07-18 RX ORDER — CARBOPROST TROMETHAMINE 250 UG/ML
250 INJECTION, SOLUTION INTRAMUSCULAR AS NEEDED
Status: CANCELLED | OUTPATIENT
Start: 2021-07-18

## 2021-07-18 RX ORDER — MISOPROSTOL 200 UG/1
800 TABLET ORAL AS NEEDED
Status: CANCELLED | OUTPATIENT
Start: 2021-07-18

## 2021-07-18 RX ORDER — ONDANSETRON 2 MG/ML
4 INJECTION INTRAMUSCULAR; INTRAVENOUS EVERY 6 HOURS PRN
Status: CANCELLED | OUTPATIENT
Start: 2021-07-18

## 2021-07-18 RX ORDER — ONDANSETRON 4 MG/1
4 TABLET, FILM COATED ORAL EVERY 6 HOURS PRN
Status: CANCELLED | OUTPATIENT
Start: 2021-07-18

## 2021-07-18 RX ORDER — ACETAMINOPHEN 325 MG/1
650 TABLET ORAL EVERY 4 HOURS PRN
Status: CANCELLED | OUTPATIENT
Start: 2021-07-18

## 2021-07-18 RX ORDER — MORPHINE SULFATE 4 MG/ML
4 INJECTION, SOLUTION INTRAMUSCULAR; INTRAVENOUS
Status: CANCELLED | OUTPATIENT
Start: 2021-07-18 | End: 2021-07-28

## 2021-07-18 RX ORDER — OXYTOCIN-SODIUM CHLORIDE 0.9% IV SOLN 30 UNIT/500ML 30-0.9/5 UT/ML-%
250 SOLUTION INTRAVENOUS CONTINUOUS
Status: CANCELLED | OUTPATIENT
Start: 2021-07-18 | End: 2021-07-18

## 2021-07-18 RX ORDER — OXYTOCIN-SODIUM CHLORIDE 0.9% IV SOLN 30 UNIT/500ML 30-0.9/5 UT/ML-%
2 SOLUTION INTRAVENOUS
Status: CANCELLED | OUTPATIENT
Start: 2021-07-18

## 2021-07-18 RX ORDER — IBUPROFEN 600 MG/1
600 TABLET ORAL EVERY 6 HOURS PRN
Status: CANCELLED | OUTPATIENT
Start: 2021-07-18

## 2021-07-18 RX ORDER — OXYTOCIN-SODIUM CHLORIDE 0.9% IV SOLN 30 UNIT/500ML 30-0.9/5 UT/ML-%
125 SOLUTION INTRAVENOUS CONTINUOUS PRN
Status: CANCELLED | OUTPATIENT
Start: 2021-07-18

## 2021-07-18 RX ORDER — SODIUM CHLORIDE, SODIUM LACTATE, POTASSIUM CHLORIDE, CALCIUM CHLORIDE 600; 310; 30; 20 MG/100ML; MG/100ML; MG/100ML; MG/100ML
125 INJECTION, SOLUTION INTRAVENOUS CONTINUOUS
Status: CANCELLED | OUTPATIENT
Start: 2021-07-18

## 2021-07-18 RX ORDER — OXYTOCIN-SODIUM CHLORIDE 0.9% IV SOLN 30 UNIT/500ML 30-0.9/5 UT/ML-%
999 SOLUTION INTRAVENOUS ONCE
Status: CANCELLED | OUTPATIENT
Start: 2021-07-18 | End: 2021-07-18

## 2021-07-18 RX ORDER — METHYLERGONOVINE MALEATE 0.2 MG/ML
200 INJECTION INTRAVENOUS ONCE AS NEEDED
Status: CANCELLED | OUTPATIENT
Start: 2021-07-18

## 2021-07-18 RX ORDER — LIDOCAINE HYDROCHLORIDE 10 MG/ML
5 INJECTION, SOLUTION EPIDURAL; INFILTRATION; INTRACAUDAL; PERINEURAL AS NEEDED
Status: CANCELLED | OUTPATIENT
Start: 2021-07-18

## 2021-07-18 RX ORDER — SODIUM CHLORIDE 0.9 % (FLUSH) 0.9 %
3-10 SYRINGE (ML) INJECTION AS NEEDED
Status: CANCELLED | OUTPATIENT
Start: 2021-07-18

## 2021-07-18 RX ORDER — SODIUM CHLORIDE 0.9 % (FLUSH) 0.9 %
3 SYRINGE (ML) INJECTION EVERY 12 HOURS SCHEDULED
Status: CANCELLED | OUTPATIENT
Start: 2021-07-18

## 2021-07-19 ENCOUNTER — ANESTHESIA (OUTPATIENT)
Dept: LABOR AND DELIVERY | Facility: HOSPITAL | Age: 26
End: 2021-07-19

## 2021-07-19 ENCOUNTER — HOSPITAL ENCOUNTER (OUTPATIENT)
Dept: LABOR AND DELIVERY | Facility: HOSPITAL | Age: 26
Discharge: HOME | End: 2021-07-19

## 2021-07-19 ENCOUNTER — HOSPITAL ENCOUNTER (INPATIENT)
Facility: HOSPITAL | Age: 26
LOS: 2 days | Discharge: HOME | End: 2021-07-21
Attending: OBSTETRICS & GYNECOLOGY | Admitting: OBSTETRICS & GYNECOLOGY

## 2021-07-19 ENCOUNTER — ANESTHESIA EVENT (OUTPATIENT)
Dept: LABOR AND DELIVERY | Facility: HOSPITAL | Age: 26
End: 2021-07-19

## 2021-07-19 PROBLEM — Z34.90 ENCOUNTER FOR INDUCTION OF LABOR: Status: ACTIVE | Noted: 2021-07-19

## 2021-07-19 LAB
ABO GROUP BLD: NORMAL
AMPHET+METHAMPHET UR QL: NEGATIVE
BARBITURATES UR QL SCN: NEGATIVE
BASOPHILS # BLD AUTO: 0 10*3/MM3 (ref 0–0.2)
BASOPHILS NFR BLD AUTO: 0.3 % (ref 0–1.5)
BENZODIAZ UR QL SCN: NEGATIVE
BLD GP AB SCN SERPL QL: NEGATIVE
CANNABINOIDS SERPL QL: NEGATIVE
COCAINE UR QL: NEGATIVE
DEPRECATED RDW RBC AUTO: 44.2 FL (ref 37–54)
EOSINOPHIL # BLD AUTO: 0.1 10*3/MM3 (ref 0–0.4)
EOSINOPHIL NFR BLD AUTO: 0.6 % (ref 0.3–6.2)
ERYTHROCYTE [DISTWIDTH] IN BLOOD BY AUTOMATED COUNT: 16 % (ref 12.3–15.4)
HCT VFR BLD AUTO: 28.6 % (ref 34–46.6)
HGB BLD-MCNC: 9.4 G/DL (ref 12–15.9)
LYMPHOCYTES # BLD AUTO: 2.3 10*3/MM3 (ref 0.7–3.1)
LYMPHOCYTES NFR BLD AUTO: 22.1 % (ref 19.6–45.3)
MCH RBC QN AUTO: 26 PG (ref 26.6–33)
MCHC RBC AUTO-ENTMCNC: 33 G/DL (ref 31.5–35.7)
MCV RBC AUTO: 78.8 FL (ref 79–97)
METHADONE UR QL SCN: NEGATIVE
MONOCYTES # BLD AUTO: 0.6 10*3/MM3 (ref 0.1–0.9)
MONOCYTES NFR BLD AUTO: 5.6 % (ref 5–12)
NEUTROPHILS NFR BLD AUTO: 7.5 10*3/MM3 (ref 1.7–7)
NEUTROPHILS NFR BLD AUTO: 71.4 % (ref 42.7–76)
NRBC BLD AUTO-RTO: 0 /100 WBC (ref 0–0.2)
OPIATES UR QL: NEGATIVE
OXYCODONE UR QL SCN: NEGATIVE
PLATELET # BLD AUTO: 232 10*3/MM3 (ref 140–450)
PMV BLD AUTO: 8.5 FL (ref 6–12)
RBC # BLD AUTO: 3.63 10*6/MM3 (ref 3.77–5.28)
RH BLD: POSITIVE
SARS-COV-2 RNA PNL SPEC NAA+PROBE: NOT DETECTED
T&S EXPIRATION DATE: NORMAL
WBC # BLD AUTO: 10.5 10*3/MM3 (ref 3.4–10.8)

## 2021-07-19 PROCEDURE — U0003 INFECTIOUS AGENT DETECTION BY NUCLEIC ACID (DNA OR RNA); SEVERE ACUTE RESPIRATORY SYNDROME CORONAVIRUS 2 (SARS-COV-2) (CORONAVIRUS DISEASE [COVID-19]), AMPLIFIED PROBE TECHNIQUE, MAKING USE OF HIGH THROUGHPUT TECHNOLOGIES AS DESCRIBED BY CMS-2020-01-R: HCPCS | Performed by: OBSTETRICS & GYNECOLOGY

## 2021-07-19 PROCEDURE — 86900 BLOOD TYPING SEROLOGIC ABO: CPT | Performed by: OBSTETRICS & GYNECOLOGY

## 2021-07-19 PROCEDURE — 80307 DRUG TEST PRSMV CHEM ANLYZR: CPT | Performed by: OBSTETRICS & GYNECOLOGY

## 2021-07-19 PROCEDURE — 25010000002 PENICILLIN G POTASSIUM PER 600000 UNITS: Performed by: OBSTETRICS & GYNECOLOGY

## 2021-07-19 PROCEDURE — 85025 COMPLETE CBC W/AUTO DIFF WBC: CPT | Performed by: OBSTETRICS & GYNECOLOGY

## 2021-07-19 PROCEDURE — 86901 BLOOD TYPING SEROLOGIC RH(D): CPT | Performed by: OBSTETRICS & GYNECOLOGY

## 2021-07-19 PROCEDURE — 10907ZC DRAINAGE OF AMNIOTIC FLUID, THERAPEUTIC FROM PRODUCTS OF CONCEPTION, VIA NATURAL OR ARTIFICIAL OPENING: ICD-10-PCS | Performed by: OBSTETRICS & GYNECOLOGY

## 2021-07-19 PROCEDURE — 25010000003 PENICILLIN G POTASSIUM PER 600000 UNITS: Performed by: OBSTETRICS & GYNECOLOGY

## 2021-07-19 PROCEDURE — 86780 TREPONEMA PALLIDUM: CPT | Performed by: OBSTETRICS & GYNECOLOGY

## 2021-07-19 PROCEDURE — 3E033VJ INTRODUCTION OF OTHER HORMONE INTO PERIPHERAL VEIN, PERCUTANEOUS APPROACH: ICD-10-PCS | Performed by: OBSTETRICS & GYNECOLOGY

## 2021-07-19 PROCEDURE — 86850 RBC ANTIBODY SCREEN: CPT | Performed by: OBSTETRICS & GYNECOLOGY

## 2021-07-19 PROCEDURE — C1755 CATHETER, INTRASPINAL: HCPCS | Performed by: ANESTHESIOLOGY

## 2021-07-19 PROCEDURE — 88307 TISSUE EXAM BY PATHOLOGIST: CPT | Performed by: OBSTETRICS & GYNECOLOGY

## 2021-07-19 RX ORDER — ONDANSETRON 4 MG/1
4 TABLET, FILM COATED ORAL EVERY 6 HOURS PRN
Status: DISCONTINUED | OUTPATIENT
Start: 2021-07-19 | End: 2021-07-19 | Stop reason: HOSPADM

## 2021-07-19 RX ORDER — FENTANYL 0.2 MG/100ML-BUPIV 0.125%-NACL 0.9% EPIDURAL INJ 2/0.125 MCG/ML-%
SOLUTION INJECTION
Status: DISPENSED
Start: 2021-07-19 | End: 2021-07-20

## 2021-07-19 RX ORDER — SODIUM CHLORIDE 0.9 % (FLUSH) 0.9 %
3 SYRINGE (ML) INJECTION EVERY 12 HOURS SCHEDULED
Status: DISCONTINUED | OUTPATIENT
Start: 2021-07-19 | End: 2021-07-19 | Stop reason: HOSPADM

## 2021-07-19 RX ORDER — CITALOPRAM 10 MG/1
10 TABLET ORAL DAILY
COMMUNITY
End: 2022-04-12

## 2021-07-19 RX ORDER — OXYTOCIN-SODIUM CHLORIDE 0.9% IV SOLN 30 UNIT/500ML 30-0.9/5 UT/ML-%
999 SOLUTION INTRAVENOUS ONCE
Status: DISCONTINUED | OUTPATIENT
Start: 2021-07-19 | End: 2021-07-19 | Stop reason: HOSPADM

## 2021-07-19 RX ORDER — BISACODYL 10 MG
10 SUPPOSITORY, RECTAL RECTAL DAILY PRN
Status: DISCONTINUED | OUTPATIENT
Start: 2021-07-20 | End: 2021-07-21 | Stop reason: HOSPADM

## 2021-07-19 RX ORDER — ONDANSETRON 2 MG/ML
4 INJECTION INTRAMUSCULAR; INTRAVENOUS EVERY 6 HOURS PRN
Status: DISCONTINUED | OUTPATIENT
Start: 2021-07-19 | End: 2021-07-19 | Stop reason: SDUPTHER

## 2021-07-19 RX ORDER — ONDANSETRON 4 MG/1
4 TABLET, FILM COATED ORAL EVERY 8 HOURS PRN
Status: DISCONTINUED | OUTPATIENT
Start: 2021-07-19 | End: 2021-07-21 | Stop reason: HOSPADM

## 2021-07-19 RX ORDER — CALCIUM CARBONATE 200(500)MG
2 TABLET,CHEWABLE ORAL 3 TIMES DAILY PRN
Status: DISCONTINUED | OUTPATIENT
Start: 2021-07-19 | End: 2021-07-21 | Stop reason: HOSPADM

## 2021-07-19 RX ORDER — MORPHINE SULFATE 4 MG/ML
4 INJECTION, SOLUTION INTRAMUSCULAR; INTRAVENOUS
Status: DISCONTINUED | OUTPATIENT
Start: 2021-07-19 | End: 2021-07-19 | Stop reason: HOSPADM

## 2021-07-19 RX ORDER — SODIUM CHLORIDE 0.9 % (FLUSH) 0.9 %
1-10 SYRINGE (ML) INJECTION AS NEEDED
Status: DISCONTINUED | OUTPATIENT
Start: 2021-07-19 | End: 2021-07-21 | Stop reason: HOSPADM

## 2021-07-19 RX ORDER — IBUPROFEN 600 MG/1
600 TABLET ORAL EVERY 6 HOURS PRN
Status: DISCONTINUED | OUTPATIENT
Start: 2021-07-19 | End: 2021-07-19 | Stop reason: HOSPADM

## 2021-07-19 RX ORDER — OXYTOCIN-SODIUM CHLORIDE 0.9% IV SOLN 30 UNIT/500ML 30-0.9/5 UT/ML-%
250 SOLUTION INTRAVENOUS CONTINUOUS
Status: ACTIVE | OUTPATIENT
Start: 2021-07-19 | End: 2021-07-19

## 2021-07-19 RX ORDER — HYDROCODONE BITARTRATE AND ACETAMINOPHEN 5; 325 MG/1; MG/1
1 TABLET ORAL EVERY 4 HOURS PRN
Status: DISCONTINUED | OUTPATIENT
Start: 2021-07-19 | End: 2021-07-21 | Stop reason: HOSPADM

## 2021-07-19 RX ORDER — METHYLERGONOVINE MALEATE 0.2 MG/ML
200 INJECTION INTRAVENOUS ONCE AS NEEDED
Status: DISCONTINUED | OUTPATIENT
Start: 2021-07-19 | End: 2021-07-19 | Stop reason: HOSPADM

## 2021-07-19 RX ORDER — SODIUM CHLORIDE, SODIUM LACTATE, POTASSIUM CHLORIDE, CALCIUM CHLORIDE 600; 310; 30; 20 MG/100ML; MG/100ML; MG/100ML; MG/100ML
125 INJECTION, SOLUTION INTRAVENOUS CONTINUOUS
Status: DISCONTINUED | OUTPATIENT
Start: 2021-07-19 | End: 2021-07-20

## 2021-07-19 RX ORDER — IBUPROFEN 600 MG/1
600 TABLET ORAL EVERY 6 HOURS PRN
Status: DISCONTINUED | OUTPATIENT
Start: 2021-07-19 | End: 2021-07-21 | Stop reason: HOSPADM

## 2021-07-19 RX ORDER — FENTANYL 0.2 MG/100ML-BUPIV 0.125%-NACL 0.9% EPIDURAL INJ 2/0.125 MCG/ML-%
SOLUTION INJECTION CONTINUOUS
Status: DISCONTINUED | OUTPATIENT
Start: 2021-07-19 | End: 2021-07-20

## 2021-07-19 RX ORDER — LIDOCAINE HYDROCHLORIDE AND EPINEPHRINE 10; 10 MG/ML; UG/ML
INJECTION, SOLUTION INFILTRATION; PERINEURAL AS NEEDED
Status: DISCONTINUED | OUTPATIENT
Start: 2021-07-19 | End: 2021-07-19 | Stop reason: SURG

## 2021-07-19 RX ORDER — CARBOPROST TROMETHAMINE 250 UG/ML
250 INJECTION, SOLUTION INTRAMUSCULAR AS NEEDED
Status: DISCONTINUED | OUTPATIENT
Start: 2021-07-19 | End: 2021-07-19 | Stop reason: HOSPADM

## 2021-07-19 RX ORDER — ACETAMINOPHEN 325 MG/1
650 TABLET ORAL EVERY 4 HOURS PRN
Status: DISCONTINUED | OUTPATIENT
Start: 2021-07-19 | End: 2021-07-19 | Stop reason: HOSPADM

## 2021-07-19 RX ORDER — OXYCODONE HYDROCHLORIDE 5 MG/1
10 TABLET ORAL EVERY 4 HOURS PRN
Status: DISCONTINUED | OUTPATIENT
Start: 2021-07-19 | End: 2021-07-21 | Stop reason: HOSPADM

## 2021-07-19 RX ORDER — PRENATAL VIT/IRON FUM/FOLIC AC 27MG-0.8MG
1 TABLET ORAL DAILY
Status: DISCONTINUED | OUTPATIENT
Start: 2021-07-19 | End: 2021-07-21 | Stop reason: HOSPADM

## 2021-07-19 RX ORDER — HYDROCORTISONE ACETATE PRAMOXINE HCL 2.5; 1 G/100G; G/100G
1 CREAM TOPICAL AS NEEDED
Status: DISCONTINUED | OUTPATIENT
Start: 2021-07-19 | End: 2021-07-21 | Stop reason: HOSPADM

## 2021-07-19 RX ORDER — FERROUS SULFATE 325(65) MG
325 TABLET ORAL
COMMUNITY
End: 2022-04-12

## 2021-07-19 RX ORDER — MAGNESIUM CARB/ALUMINUM HYDROX 105-160MG
30 TABLET,CHEWABLE ORAL DAILY PRN
Status: DISCONTINUED | OUTPATIENT
Start: 2021-07-19 | End: 2021-07-20

## 2021-07-19 RX ORDER — ALUMINA, MAGNESIA, AND SIMETHICONE 2400; 2400; 240 MG/30ML; MG/30ML; MG/30ML
15 SUSPENSION ORAL EVERY 6 HOURS PRN
Status: DISCONTINUED | OUTPATIENT
Start: 2021-07-19 | End: 2021-07-19 | Stop reason: HOSPADM

## 2021-07-19 RX ORDER — SODIUM CHLORIDE 0.9 % (FLUSH) 0.9 %
3-10 SYRINGE (ML) INJECTION AS NEEDED
Status: DISCONTINUED | OUTPATIENT
Start: 2021-07-19 | End: 2021-07-19 | Stop reason: HOSPADM

## 2021-07-19 RX ORDER — EPHEDRINE SULFATE 50 MG/ML
10 INJECTION, SOLUTION INTRAVENOUS
Status: DISCONTINUED | OUTPATIENT
Start: 2021-07-19 | End: 2021-07-19 | Stop reason: HOSPADM

## 2021-07-19 RX ORDER — OXYTOCIN-SODIUM CHLORIDE 0.9% IV SOLN 30 UNIT/500ML 30-0.9/5 UT/ML-%
125 SOLUTION INTRAVENOUS CONTINUOUS PRN
Status: COMPLETED | OUTPATIENT
Start: 2021-07-19 | End: 2021-07-19

## 2021-07-19 RX ORDER — LANOLIN 100 %
OINTMENT (GRAM) TOPICAL
Status: DISCONTINUED | OUTPATIENT
Start: 2021-07-19 | End: 2021-07-21 | Stop reason: HOSPADM

## 2021-07-19 RX ORDER — MISOPROSTOL 200 UG/1
800 TABLET ORAL AS NEEDED
Status: DISCONTINUED | OUTPATIENT
Start: 2021-07-19 | End: 2021-07-19 | Stop reason: HOSPADM

## 2021-07-19 RX ORDER — ONDANSETRON 2 MG/ML
4 INJECTION INTRAMUSCULAR; INTRAVENOUS ONCE AS NEEDED
Status: DISCONTINUED | OUTPATIENT
Start: 2021-07-19 | End: 2021-07-19 | Stop reason: HOSPADM

## 2021-07-19 RX ORDER — DOCUSATE SODIUM 100 MG/1
100 CAPSULE, LIQUID FILLED ORAL 2 TIMES DAILY
Status: DISCONTINUED | OUTPATIENT
Start: 2021-07-19 | End: 2021-07-21 | Stop reason: HOSPADM

## 2021-07-19 RX ORDER — OXYTOCIN-SODIUM CHLORIDE 0.9% IV SOLN 30 UNIT/500ML 30-0.9/5 UT/ML-%
2 SOLUTION INTRAVENOUS
Status: DISCONTINUED | OUTPATIENT
Start: 2021-07-19 | End: 2021-07-19 | Stop reason: HOSPADM

## 2021-07-19 RX ORDER — LIDOCAINE HYDROCHLORIDE 10 MG/ML
5 INJECTION, SOLUTION EPIDURAL; INFILTRATION; INTRACAUDAL; PERINEURAL AS NEEDED
Status: DISCONTINUED | OUTPATIENT
Start: 2021-07-19 | End: 2021-07-19 | Stop reason: HOSPADM

## 2021-07-19 RX ADMIN — SODIUM CHLORIDE 5 MILLION UNITS: 900 INJECTION INTRAVENOUS at 08:05

## 2021-07-19 RX ADMIN — OXYTOCIN 125 ML/HR: 10 INJECTION INTRAVENOUS at 16:02

## 2021-07-19 RX ADMIN — SODIUM CHLORIDE, POTASSIUM CHLORIDE, SODIUM LACTATE AND CALCIUM CHLORIDE 125 ML/HR: 600; 310; 30; 20 INJECTION, SOLUTION INTRAVENOUS at 06:33

## 2021-07-19 RX ADMIN — MAGNESIUM HYDROXIDE,ALUMINUM HYDROXICE,SIMETHICONE 15 ML: 240; 2400; 2400 SUSPENSION ORAL at 08:57

## 2021-07-19 RX ADMIN — WITCH HAZEL 1 PAD: 500 SOLUTION RECTAL; TOPICAL at 16:55

## 2021-07-19 RX ADMIN — DOCUSATE SODIUM 100 MG: 100 CAPSULE ORAL at 22:47

## 2021-07-19 RX ADMIN — Medication 1 APPLICATION: at 16:55

## 2021-07-19 RX ADMIN — OXYTOCIN 2 MILLI-UNITS/MIN: 10 INJECTION INTRAVENOUS at 06:33

## 2021-07-19 RX ADMIN — LIDOCAINE HYDROCHLORIDE,EPINEPHRINE BITARTRATE 3 ML: 10; .01 INJECTION, SOLUTION INFILTRATION; PERINEURAL at 14:05

## 2021-07-19 RX ADMIN — PENICILLIN G POTASSIUM 2.5 MILLION UNITS: 20000000 INJECTION, POWDER, FOR SOLUTION INTRAVENOUS at 12:15

## 2021-07-19 NOTE — ANESTHESIA PROCEDURE NOTES
Labor Epidural      Patient reassessed immediately prior to procedure    Patient location during procedure: OB  Start Time: 7/19/2021 1:55 PM  Stop Time: 7/19/2021 2:15 PM  Performed By  Anesthesiologist: Nash Valencia MD  Preanesthetic Checklist  Completed: patient identified, IV checked, site marked, risks and benefits discussed, surgical consent, monitors and equipment checked, pre-op evaluation and timeout performed  Prep:  Pt Position:sitting  Sterile Tech:gloves, cap, mask and sterile barrier  Prep:chlorhexidine gluconate and isopropyl alcohol  Monitoring:blood pressure monitoring, continuous pulse oximetry and EKG  Epidural Block Procedure:  Approach:midline  Guidance:landmark technique and palpation technique  Location:L3-L4  Needle Type:Tuohy  Needle Gauge:17 G  Loss of Resistance Medium: saline  Loss of Resistance: 4cm  Cath Depth at skin:10 cm  Paresthesia: none  Aspiration:negative  Med administered at 7/19/2021 2:07 PM  Number of Attempts: 1  Post Assessment:  Dressing:occlusive dressing applied and secured with tape  Pt Tolerance:patient tolerated the procedure well with no apparent complications  Complications:no

## 2021-07-19 NOTE — H&P
[facility]keon  Obstetric History and Physical     Chief Complaint: IOL at term    Subjective     Patient is a 25 y.o. female  currently at 39w4d, who presents for IOL at term.    Her prenatal care is c/b anemia.      Prenatal Information:  External Prenatal Results     Pregnancy Outside Results - Transcribed From Office Records - See Scanned Records For Details     Test Value Date Time    ABO  A  21 0618    Rh  Positive  21 0618    Antibody Screen  Negative  21 06    Varicella IgG       Rubella ^ Immune  19     Hgb  9.4 g/dL 21 0618    Hct  28.6 % 21 0618    Glucose Fasting GTT       Glucose Tolerance Test 1 hour       Glucose Tolerance Test 3 hour       Gonorrhea (discrete)       Chlamydia (discrete)       RPR ^ Negative  19     VDRL       Syphilis Antibody       HBsAg ^ Negative  19     Herpes Simplex Virus PCR       Herpes Simplex VIrus Culture       HIV  Non-Reactive  19 0625    Hep C RNA Quant PCR       Hep C Antibody ^ Negative  19     AFP       Group B Strep ^ POS  19     GBS Susceptibility to Clindamycin       GBS Susceptibility to Erythromycin       Fetal Fibronectin       Genetic Testing, Maternal Blood             Drug Screening     Test Value Date Time    Urine Drug Screen       Amphetamine Screen  Negative  19 0537    Barbiturate Screen  Negative  21 0619    Benzodiazepine Screen  Negative  21 0619    Methadone Screen  Negative  21 0619    Phencyclidine Screen  Negative  19 0537    Opiates Screen  Negative  21 0619    THC Screen  Negative  21 0619    Cocaine Screen       Propoxyphene Screen       Buprenorphine Screen       Methamphetamine Screen       Oxycodone Screen  Negative  21 0619    Tricyclic Antidepressants Screen             Legend    ^: Historical                         Past OB History:    Pregnancy History     #1  [2015]: 3wks SAB   #2  [10/09/2016]: 40w3d M/Patrick 7lb  14oz Epid 17hrs  Dr Hdz/  [facility] med. comments: no complications   #3  [2019]: 39wks F/Patyn 8lb 4oz Epi  ALLEGRAF/Luis;no  complications   #4        Past Medical History: Past Medical History:   Diagnosis Date   • Asthma    • Chlamydia    • Substance abuse (CMS/[facility])     marijauna use   • Trauma         Past Surgical History History reviewed. No pertinent surgical history.     Family History: No family history on file.   Social History:  reports that she quit smoking about 2 years ago. She has never used smokeless tobacco.   reports no history of alcohol use.   reports current drug use. Drug: Marijuana.        General ROS: Pertinent items are noted in HPI    Objective      Vitals:     Vitals:    21 0830 21 0851 21 0900 21 0930   BP:  107/61 104/56    Pulse: 97  91 88   Resp:       Temp:       TempSrc:       SpO2: 100%  100%    Weight:       Height:           Fetal Heart Rate Assessment:   135, mod variability    Camp[place]illas:   Every 2-3 min     Physical Exam:     General Appearance:    Alert, cooperative, in no acute distress   Abdomen:     Soft, non-tender, EFW 7 1/2 lbs   Pelvic Exam:    Presentation: vtx    Cervix: was checked (by me): 3 cm / 75% % / -1 and AROM- Clear, pelvis clinically adequate   Extremities:   Moves all extremities well   Skin:   No bleeding, bruising or rash         Laboratory Results:   Lab Results (last 48 hours)     Procedure Component Value Units Date/Time    COVID PRE-OP / PRE-PROCEDURE SCREENING ORDER (NO ISOLATION) - Swab, Nasopharynx [[id number]]  (Normal) Collected: 21    Specimen: Swab from Nasopharynx Updated: 21    Narrative:      The following orders were created for panel order COVID PRE-OP / PRE-PROCEDURE SCREENING ORDER (NO ISOLATION) - Swab, Nasopharynx.  Procedure                               Abnormality         Status                     ---------                               -----------         ------                      COVID-19,CEPHEID,COR/KALLIE...[[id number]]  Normal              Final result                 Please view results for these tests on the individual orders.    COVID-19,CEPHEID,COR/KALLIE/PAD/YANCI IN-HOUSE(OR EMERGENT/ADD-ON),NP SWAB IN TRANSPORT MEDIA 3-4 HR TAT, RT-PCR - Swab, Nasopharynx [[id number]]  (Normal) Collected: 07/19/21 0619    Specimen: Swab from Nasopharynx Updated: 07/19/21 0814     COVID19 Not Detected    Narrative:      Fact sheet for providers: https://www.fda.gov/media/639611/download     Fact sheet for patients: https://www.fda.gov/media/025196/download  Fact sheet for providers: https://www.fda.gov/media/613408/download     Fact sheet for patients: https://www.fda.gov/media/071343/download    Urine Drug Screen - [[id number]]  (Normal) Collected: 07/19/21 0619    Specimen: Urine Updated: 07/19/21 0658     Amphet/Methamphet, Screen Negative     Barbiturates Screen, Urine Negative     Benzodiazepine Screen, Urine Negative     Cocaine Screen, Urine Negative     Opiate Screen Negative     THC, Screen, Urine Negative     Methadone Screen, Urine Negative     Oxycodone Screen, Urine Negative    Narrative:      Negative Thresholds Per Drugs Screened:    Amphetamines                 500 ng/ml  Barbiturates                 200 ng/ml  Benzodiazepines              100 ng/ml  Cocaine                      300 ng/ml  Methadone                    300 ng/ml  Opiates                      300 ng/ml  Oxycodone                    100 ng/ml  THC                           50 ng/ml    The Normal Value for all drugs tested is negative. This report includes final unconfirmed screening results to be used for medical treatment purposes only. Unconfirmed results must not be used for non-medical purposes such as employment or legal testing. Clinical consideration should be applied to any drug of abuse test, particularly when unconfirmed results are used.          All urine drugs of abuse requests without chain of custody are for  medical screening purposes only.  False positives are possible.      CBC & Differential [[id number]]  (Abnormal) Collected: 07/19/21 0618    Specimen: Blood Updated: 07/19/21 0632    Narrative:      The following orders were created for panel order CBC & Differential.  Procedure                               Abnormality         Status                     ---------                               -----------         ------                     CBC Auto Differential[[id number]]        Abnormal            Final result                 Please view results for these tests on the individual orders.    CBC Auto Differential [[id number]]  (Abnormal) Collected: 07/19/21 0618    Specimen: Blood Updated: 07/19/21 0632     WBC 10.50 10*3/mm3      RBC 3.63 10*6/mm3      Hemoglobin 9.4 g/dL      Hematocrit 28.6 %      MCV 78.8 fL      MCH 26.0 pg      MCHC 33.0 g/dL      RDW 16.0 %      RDW-SD 44.2 fl      MPV 8.5 fL      Platelets 232 10*3/mm3      Neutrophil % 71.4 %      Lymphocyte % 22.1 %      Monocyte % 5.6 %      Eosinophil % 0.6 %      Basophil % 0.3 %      Neutrophils, Absolute 7.50 10*3/mm3      Lymphocytes, Absolute 2.30 10*3/mm3      Monocytes, Absolute 0.60 10*3/mm3      Eosinophils, Absolute 0.10 10*3/mm3      Basophils, Absolute 0.00 10*3/mm3      nRBC 0.0 /100 WBC     T Pallidum Antibody (FTA-Ab) [[id number]] Collected: 07/19/21 0618    Specimen: Blood Updated: 07/19/21 0629             Assessment/Plan     Active Problems:    Encounter for induction of labor         Assessment:  1.  Intrauterine pregnancy at 39w4d gestation with reassuring fetal status.    2.  IOL at term  3.  GBS status: Negative  4.  FSR    Plan:  1. Vaginal anticipated  2.        Veronica Smith MD   7/19/2021   10:08 EDT

## 2021-07-19 NOTE — ANESTHESIA PREPROCEDURE EVALUATION
Anesthesia Evaluation     Patient summary reviewed   NPO Solid Status: > 8 hours  NPO Liquid Status: > 8 hours           Airway   Mallampati: II  TM distance: >3 FB  Neck ROM: full  No difficulty expected  Dental - normal exam     Pulmonary - normal exam   (+) asthma,  Cardiovascular - normal exam  Exercise tolerance: good (4-7 METS)        Neuro/Psych  GI/Hepatic/Renal/Endo      Musculoskeletal     Abdominal  - normal exam    Bowel sounds: normal.   Substance History      OB/GYN          Other                        Anesthesia Plan    ASA 2     epidural     intravenous induction

## 2021-07-19 NOTE — NURSING NOTE
MD on unit & notified pt requests PCN treatment for positive GBS in her last pregnancy. Pt requests to have maalox and for her water to be broken after 10am. Pt wishes to go natural    New orders:  Maalox prn, pt can ambulate on portable monitors, PCN per protocol

## 2021-07-19 NOTE — L&D DELIVERY NOTE
[facility]  Vaginal Delivery Note    Pre-delivery diagnosis     1. 25 y.o.  at 39w4d  2. IOL at term    Post-delivery diagnosis  Same  Nuchal cord x 1    Delivery     Delivery:  Spontaneous Vaginal Delivery    Date of Delivery:  2021   Anesthesia:  Epidural    Delivering clinician: Veronica Smith MD      Pt presented for IOL at term. She was started on pitocin. She had AROM with clear fluid. An epidural was placed. She progressed to C/C/+2.    She pushed for less than 10 minutes with excellent maternal effort. The head delivered in OA, then the shoulders and remainder delivered without difficulty. A nuchal cord was present and was reduced. L shoulder anterior. The mouth and nose were suctioned. There was good cry, color, tone and movement of all extremities. The infant was placed on the mother's chest and abdomen. The cord was clamped and cut. Cord gasses and blood were drawn. The placenta delivered spontaneously, intact and with a 3 vessel cord. The uterus, cervix and vagina were explored. There were no lacerations. Good cosmesis and hemostasis noted. Sponge and needle counts correct. The patient and infant were left to recover in L&D.    Infant    Findings: male infant       Apgars:   8 @ 1 minute /   9 @ 5 minutes         AB.398/3.3  VB.509/3.7    Placenta, Cord, and Fluid    Placenta delivered  spontaneous  3VC          Lacerations       none     Estimated Blood Loss 200 mL     Complications  none    Disposition  Mother to Mother Baby/Postpartum  in stable condition.  Baby remains with mom  in stable condition.      Veronica Smith MD  21  14:55 EDT

## 2021-07-20 LAB
BASOPHILS # BLD AUTO: 0.1 10*3/MM3 (ref 0–0.2)
BASOPHILS NFR BLD AUTO: 0.9 % (ref 0–1.5)
DEPRECATED RDW RBC AUTO: 44.6 FL (ref 37–54)
EOSINOPHIL # BLD AUTO: 0 10*3/MM3 (ref 0–0.4)
EOSINOPHIL NFR BLD AUTO: 0.3 % (ref 0.3–6.2)
ERYTHROCYTE [DISTWIDTH] IN BLOOD BY AUTOMATED COUNT: 16.1 % (ref 12.3–15.4)
HCT VFR BLD AUTO: 27.2 % (ref 34–46.6)
HGB BLD-MCNC: 9.2 G/DL (ref 12–15.9)
LYMPHOCYTES # BLD AUTO: 2.4 10*3/MM3 (ref 0.7–3.1)
LYMPHOCYTES NFR BLD AUTO: 18.9 % (ref 19.6–45.3)
MCH RBC QN AUTO: 26.6 PG (ref 26.6–33)
MCHC RBC AUTO-ENTMCNC: 34 G/DL (ref 31.5–35.7)
MCV RBC AUTO: 78.2 FL (ref 79–97)
MONOCYTES # BLD AUTO: 0.7 10*3/MM3 (ref 0.1–0.9)
MONOCYTES NFR BLD AUTO: 5.4 % (ref 5–12)
NEUTROPHILS NFR BLD AUTO: 74.5 % (ref 42.7–76)
NEUTROPHILS NFR BLD AUTO: 9.6 10*3/MM3 (ref 1.7–7)
NRBC BLD AUTO-RTO: 0 /100 WBC (ref 0–0.2)
PLATELET # BLD AUTO: 190 10*3/MM3 (ref 140–450)
PMV BLD AUTO: 8.5 FL (ref 6–12)
RBC # BLD AUTO: 3.48 10*6/MM3 (ref 3.77–5.28)
WBC # BLD AUTO: 12.9 10*3/MM3 (ref 3.4–10.8)

## 2021-07-20 PROCEDURE — 85025 COMPLETE CBC W/AUTO DIFF WBC: CPT | Performed by: OBSTETRICS & GYNECOLOGY

## 2021-07-20 RX ADMIN — IBUPROFEN 600 MG: 600 TABLET, FILM COATED ORAL at 20:39

## 2021-07-20 RX ADMIN — IBUPROFEN 600 MG: 600 TABLET, FILM COATED ORAL at 00:43

## 2021-07-20 RX ADMIN — IBUPROFEN 600 MG: 600 TABLET, FILM COATED ORAL at 08:18

## 2021-07-20 RX ADMIN — PRENATAL VITAMINS-IRON FUMARATE 27 MG IRON-FOLIC ACID 0.8 MG TABLET 1 TABLET: at 08:19

## 2021-07-20 RX ADMIN — DOCUSATE SODIUM 100 MG: 100 CAPSULE ORAL at 08:18

## 2021-07-20 RX ADMIN — IBUPROFEN 600 MG: 600 TABLET, FILM COATED ORAL at 14:56

## 2021-07-20 RX ADMIN — DOCUSATE SODIUM 100 MG: 100 CAPSULE ORAL at 20:40

## 2021-07-20 NOTE — LACTATION NOTE
This note was copied from a baby's chart.  Pt denies hx of breast surgery, no allergy to wool or foods. Medela gel patches provided, instructed on use.   She has a Medela pump at home. She has bf her babies with some latching difficulties creating nipple damage.  Teaching done. Assisted to position, demo wide latch, feeding well. States more comfortable latch.  Encouraged to feed on demand, will call for help as needed.

## 2021-07-20 NOTE — PROGRESS NOTES
[facility]  Postpartum Note    Subjective   Postpartum Day 1:  Spontaneous Vaginal Delivery    Patient without complaints. Her pain is well controlled with nonsteroidal anti-inflammatory drugs. She is ambulating well.  Patient describes her bleeding as thin lochia.    Breastfeeding: infant latching without difficulty.    Objective     Vitals:  Vitals:    07/19/21 1645 07/19/21 1900 07/19/21 2314 07/20/21 0248   BP:  111/67 108/67 105/67   BP Location:  Right arm Right arm Right arm   Patient Position:  Lying Lying Lying   Pulse: 68 91 71 77   Resp:  17 16 17   Temp:  98 °F (36.7 °C) 98.6 °F (37 °C) 98.2 °F (36.8 °C)   TempSrc:  Oral Oral Oral   SpO2:  99% 98% 97%   Weight:       Height:           Physical Exam:  General:  Alert and oriented x3. No acute distress.  Abdomen: abdomen is soft without significant tenderness, masses, organomegaly or guarding. Fundus: appropriate, firm, non tender  Incision: N/A  Skin: Warm, Dry  Extremities: Normal,  trace edema. Nontender     Labs:  Results from last 7 days   Lab Units 07/20/21  0423 07/19/21  0618   WBC 10*3/mm3 12.90* 10.50   HEMOGLOBIN g/dL 9.2* 9.4*   HEMATOCRIT % 27.2* 28.6*   PLATELETS 10*3/mm3 190 232            Feeding method: Breastfeeding Status: Unknown     Blood Type: RH Positive        Assessment/Plan     Active Problems:    Encounter for induction of labor      Jewell Arcinieganaseem is Day 1  post-partum from a  Spontaneous Vaginal Delivery      Plan:  routine, continue present management, monitor pain and plan d/c tomorrow.       Adenike Marcial NP  7/20/2021  08:36 EDT

## 2021-07-20 NOTE — PLAN OF CARE
Goal Outcome Evaluation:  Plan of Care Reviewed With: patient        Progress: improving  Outcome Summary: Pt pain controlled with prn pain medicine. Pt feels comfortable with breastfeeding.

## 2021-07-20 NOTE — PLAN OF CARE
Goal Outcome Evaluation:  Doing well.  Voiding without difficulty.  Vaginal bleeding is normal.  Needs help with nursing.  Infant not really interested.  Continue to monitor.

## 2021-07-21 VITALS
DIASTOLIC BLOOD PRESSURE: 77 MMHG | RESPIRATION RATE: 17 BRPM | BODY MASS INDEX: 31.89 KG/M2 | SYSTOLIC BLOOD PRESSURE: 130 MMHG | HEIGHT: 66 IN | WEIGHT: 198.41 LBS | OXYGEN SATURATION: 98 % | TEMPERATURE: 98.7 F | HEART RATE: 85 BPM

## 2021-07-21 LAB
LAB AP CASE REPORT: NORMAL
PATH REPORT.FINAL DX SPEC: NORMAL
PATH REPORT.GROSS SPEC: NORMAL
T PALLIDUM AB SER QL IF: NON REACTIVE

## 2021-07-21 RX ORDER — IBUPROFEN 600 MG/1
600 TABLET ORAL EVERY 6 HOURS PRN
Qty: 60 TABLET | Refills: 1 | Status: SHIPPED | OUTPATIENT
Start: 2021-07-21 | End: 2022-04-12

## 2021-07-21 RX ADMIN — IBUPROFEN 600 MG: 600 TABLET, FILM COATED ORAL at 08:40

## 2021-07-21 RX ADMIN — DOCUSATE SODIUM 100 MG: 100 CAPSULE ORAL at 08:22

## 2021-07-21 RX ADMIN — IBUPROFEN 600 MG: 600 TABLET, FILM COATED ORAL at 03:08

## 2021-07-21 RX ADMIN — PRENATAL VITAMINS-IRON FUMARATE 27 MG IRON-FOLIC ACID 0.8 MG TABLET 1 TABLET: at 08:22

## 2021-07-21 NOTE — SIGNIFICANT NOTE
Case Management Discharge Note                Selected Continued Care - Discharged on 7/21/2021 Admission date: 7/19/2021 - Discharge disposition: Home or Self Care                   Final Discharge Disposition Code: (P) 01 - home or self-care

## 2021-07-21 NOTE — LACTATION NOTE
This note was copied from a baby's chart.  Pt visited, states her breasts are russo today, baby cluster fed last night. Denies nipple tenderness.  Teaching complete. Plans d/c today. Will follow up as needed.

## 2021-07-21 NOTE — PLAN OF CARE
Problem: Adult Inpatient Plan of Care  Goal: Plan of Care Review  Outcome: Ongoing, Progressing  Flowsheets (Taken 7/21/2021 5669)  Progress: improving  Plan of Care Reviewed With: patient  Outcome Summary: Pt pain controlled by Motrin.   Goal Outcome Evaluation:  Plan of Care Reviewed With: patient        Progress: improving  Outcome Summary: Pt pain controlled by Motrin.

## 2021-07-21 NOTE — DISCHARGE SUMMARY
[facility]  Delivery Discharge Summary    Primary OB Clinician: Veronica Smith MD    Admission Diagnosis:  Active Problems:    Encounter for induction of labor      Discharge Diagnosis:  delivered    Gestational Age: 39w4d    Date of Delivery: 2021     Delivered By:  Veronica Smith     Delivery Type: Vaginal, Spontaneous      Tubal Ligation: n/a    Intrapartum Course: Uncomplicated delivery.     Postpartum Course:  Pt was admitted and underwent  Spontaneous Vaginal Delivery. Pt was transferred to PP where she had an uncomplicated course. Pt remained AFVSS, had scant lochia and pain was well controlled. Pt d/c home in stable condition and will f/u in office for PP visit as scheduled or PRN. Currently breastfeeding. Plans on IUD or Depo  for contraception.     Physical Exam:    Vitals:   Vitals:    21 0248 21 1630 21 0030 21 0830   BP: 105/67 107/70 104/66 130/77   BP Location: Right arm Right arm Right arm Right arm   Patient Position: Lying Sitting Lying Lying   Pulse: 77 72 80 85   Resp: 17 18 17 17   Temp: 98.2 °F (36.8 °C)  98.7 °F (37.1 °C)    TempSrc: Oral Oral Oral Oral   SpO2: 97% 100% 93% 98%   Weight:       Height:         Temp (24hrs), Av.7 °F (37.1 °C), Min:98.7 °F (37.1 °C), Max:98.7 °F (37.1 °C)      General Appearance:    Alert, cooperative, in no acute distress   Abdomen:     Soft non-tender, non-distended, no guarding, no rebound         tenderness.   Extremities:   Moves all extremities well, no edema, no cyanosis, no              Redness.   Incision:  N/A   Fundus:   Firm, below umbilicus     Feeding method: Breastfeeding Status: Yes    Labs:  Results from last 7 days   Lab Units 21  0423 21  0618   WBC 10*3/mm3 12.90* 10.50   HEMOGLOBIN g/dL 9.2* 9.4*   HEMATOCRIT % 27.2* 28.6*   PLATELETS 10*3/mm3 190 232           Blood Type: RH Positive      Plan:  Discharge to home.    Follow-up appointment with Dr Smith in 6 weeks.    Adenike   DENY Marcial  7/21/2021  09:38 EDT

## 2022-04-12 ENCOUNTER — OFFICE VISIT (OUTPATIENT)
Dept: INTERNAL MEDICINE | Facility: CLINIC | Age: 27
End: 2022-04-12

## 2022-04-12 VITALS
HEIGHT: 66 IN | WEIGHT: 177 LBS | OXYGEN SATURATION: 100 % | HEART RATE: 68 BPM | DIASTOLIC BLOOD PRESSURE: 66 MMHG | BODY MASS INDEX: 28.45 KG/M2 | SYSTOLIC BLOOD PRESSURE: 124 MMHG

## 2022-04-12 DIAGNOSIS — Z30.09 ENCOUNTER FOR COUNSELING REGARDING CONTRACEPTION: Primary | ICD-10-CM

## 2022-04-12 DIAGNOSIS — Z00.00 ROUTINE HEALTH MAINTENANCE: ICD-10-CM

## 2022-04-12 DIAGNOSIS — R41.840 DIFFICULTY CONCENTRATING: ICD-10-CM

## 2022-04-12 DIAGNOSIS — F41.9 ANXIETY: ICD-10-CM

## 2022-04-12 DIAGNOSIS — F39 MOOD DISORDER: ICD-10-CM

## 2022-04-12 PROBLEM — Z34.90 ENCOUNTER FOR INDUCTION OF LABOR: Status: RESOLVED | Noted: 2021-07-19 | Resolved: 2022-04-12

## 2022-04-12 PROCEDURE — 99213 OFFICE O/P EST LOW 20 MIN: CPT | Performed by: NURSE PRACTITIONER

## 2022-04-12 NOTE — PATIENT INSTRUCTIONS
[facility] or [facility] Direct - [email or web address]

## 2022-04-12 NOTE — PROGRESS NOTES
"Subjective    Jewell Schmidt is a 26 y.o. female presenting today for   Chief Complaint   Patient presents with   • Establish Care     Has questions about contraception and mood issues       History of Present Illness     Jewell Schmidt presents today as a new patient to me to establish care.   Prior PCP was no one for the last couple of years.  Patient Care Team:  Elena Villatoro APRN as PCP - General (Family Medicine)  Veronica Smith MD as Consulting Physician (Obstetrics and Gynecology)    Current/chronic health conditions include:    Patient Active Problem List   Diagnosis   (none) - all problems resolved or deleted       No outpatient medications have been marked as taking for the 4/12/22 encounter (Office Visit) with Elena Villatoro APRN.           New complaints today include:  She has questions re contraception. She did not do well w/ remembering OCPs. She would like to have one more child but not for 2-3 more years.    She is struggling w/ mood. She has been tx'ed w/ Celexa for depression in the past but stopped taking this with her last pregnancy. She reports getting \"bored.\" She changes jobs frequently d/t boredom. She struggles w/ focus. She reports anger issues that come and go. She reports not cleaning the house for weeks and then having bursts of energy. She notes over worry and over thinking. Constantly stressing about things that shouldn't cause stress. She previously saw a therapist but stopped in 2020 eliza the only option became virtual. She would prefer to see a provider in person.      The following portions of the patient's history were reviewed and updated as appropriate: allergies, current medications, problem list, past medical history, past surgical history, family history, and social history.         Objective    Vitals:    04/12/22 0853   BP: 124/66   Pulse: 68   SpO2: 100%   Weight: 80.3 kg (177 lb)   Height: 167.6 cm (66\")     Body mass index is 28.57 " kg/m².  Nursing notes and vitals reviewed.    Physical Exam  Constitutional:       General: She is not in acute distress.     Appearance: She is well-developed.   Pulmonary:      Effort: Pulmonary effort is normal.   Neurological:      Mental Status: She is alert.   Psychiatric:         Attention and Perception: She is attentive.         Speech: Speech normal.         No results found for this or any previous visit (from the past 672 hour(s)).      Assessment and Plan    Diagnoses and all orders for this visit:    1. Encounter for counseling regarding contraception (Primary)  Comments:  - discussed various options for contraception, she is interested in IUD  - advised to schedule appt w/ GYN    2. Mood disorder (HCC)  -     Ambulatory Referral to Behavioral Health    3. Anxiety  -     Ambulatory Referral to Behavioral Health    4. Difficulty concentrating  -     Ambulatory Referral to Behavioral Health    5. Routine health maintenance  -     CBC (No Diff)  -     Comprehensive Metabolic Panel  -     Lipid Panel With / Chol / HDL Ratio  -     TSH  -     Vitamin B12  -     Vitamin D 25 Hydroxy            The plan of care was discussed. All questions were answered. Patient verbalized understanding.        Return for fasting labs one week prior to, Annual physical.

## 2022-05-27 ENCOUNTER — OFFICE VISIT (OUTPATIENT)
Dept: INTERNAL MEDICINE | Facility: CLINIC | Age: 27
End: 2022-05-27

## 2022-05-27 VITALS
OXYGEN SATURATION: 97 % | RESPIRATION RATE: 18 BRPM | HEART RATE: 97 BPM | DIASTOLIC BLOOD PRESSURE: 72 MMHG | HEIGHT: 66 IN | TEMPERATURE: 98.4 F | SYSTOLIC BLOOD PRESSURE: 124 MMHG | WEIGHT: 176.8 LBS | BODY MASS INDEX: 28.42 KG/M2

## 2022-05-27 DIAGNOSIS — Z00.00 ENCOUNTER FOR WELLNESS EXAMINATION IN ADULT: Primary | ICD-10-CM

## 2022-05-27 PROCEDURE — 99395 PREV VISIT EST AGE 18-39: CPT | Performed by: NURSE PRACTITIONER

## 2022-05-27 RX ORDER — DEXTROAMPHETAMINE SACCHARATE, AMPHETAMINE ASPARTATE MONOHYDRATE, DEXTROAMPHETAMINE SULFATE AND AMPHETAMINE SULFATE 1.25; 1.25; 1.25; 1.25 MG/1; MG/1; MG/1; MG/1
5 CAPSULE, EXTENDED RELEASE ORAL DAILY PRN
COMMUNITY
Start: 2022-05-02

## 2022-05-27 RX ORDER — DULOXETIN HYDROCHLORIDE 30 MG/1
30 CAPSULE, DELAYED RELEASE ORAL DAILY
COMMUNITY
Start: 2022-05-02

## 2022-05-27 NOTE — PROGRESS NOTES
"ALIA Corcoran is a 26 y.o. female presenting for Annual Exam    Her current/chronic health conditions include:  Patient Active Problem List   Diagnosis   (none) - all problems resolved or deleted       Outpatient Medications Marked as Taking for the 5/27/22 encounter (Office Visit) with Elena Villatoro APRN   Medication Sig Dispense Refill   • DULoxetine (CYMBALTA) 30 MG capsule Take 30 mg by mouth Daily.       She missed her fasting lab appt.    She learned she was pregnant w/ baby #4 last week.      Screenings:  PAP: per GYN  Mammogram: N/A  Dexa: n/a  Colon Cancer: n/a  Tob use: former        The following portions of the patient's history were reviewed and updated as appropriate: allergies, current medications, problem list, past medical history, past family history, and past social history.        OBJECTIVE    Vitals:    05/27/22 1501   BP: 124/72   BP Location: Left arm   Patient Position: Sitting   Cuff Size: Adult   Pulse: 97   Resp: 18   Temp: 98.4 °F (36.9 °C)   TempSrc: Oral   SpO2: 97%   Weight: 80.2 kg (176 lb 12.8 oz)   Height: 167.6 cm (66\")       BP Readings from Last 3 Encounters:   05/27/22 124/72   04/12/22 124/66   07/21/21 130/77        Wt Readings from Last 3 Encounters:   05/27/22 80.2 kg (176 lb 12.8 oz)   04/12/22 80.3 kg (177 lb)   07/19/21 90 kg (198 lb 6.6 oz)        Body mass index is 28.54 kg/m².  Nursing notes and vital signs reviewed.      Physical Exam  Constitutional:       General: She is not in acute distress.     Appearance: Normal appearance. She is well-developed.   HENT:      Head: Normocephalic.      Right Ear: Hearing, tympanic membrane, ear canal and external ear normal.      Left Ear: Hearing, tympanic membrane, ear canal and external ear normal.      Nose: Nose normal. No mucosal edema or rhinorrhea.      Mouth/Throat:      Mouth: Mucous membranes are moist.      Pharynx: Oropharynx is clear. Uvula midline.   Eyes:      General: Lids are normal.      " Extraocular Movements: Extraocular movements intact.      Conjunctiva/sclera: Conjunctivae normal.      Pupils: Pupils are equal, round, and reactive to light.   Neck:      Thyroid: No thyroid mass or thyromegaly.   Cardiovascular:      Rate and Rhythm: Regular rhythm.      Pulses: Normal pulses.      Heart sounds: S1 normal and S2 normal. No murmur heard.    No friction rub. No gallop.   Pulmonary:      Effort: Pulmonary effort is normal.      Breath sounds: Normal breath sounds. No wheezing, rhonchi or rales.   Abdominal:      General: Bowel sounds are normal.      Palpations: Abdomen is soft.      Tenderness: There is no abdominal tenderness. There is no guarding.      Hernia: No hernia is present.   Musculoskeletal:         General: No deformity. Normal range of motion.      Cervical back: Normal range of motion and neck supple.   Lymphadenopathy:      Cervical: No cervical adenopathy.   Skin:     General: Skin is warm and dry.      Findings: No lesion or rash.   Neurological:      General: No focal deficit present.      Mental Status: She is alert and oriented to person, place, and time.      Cranial Nerves: No cranial nerve deficit.      Sensory: No sensory deficit.      Motor: Motor function is intact.      Coordination: Coordination is intact.      Gait: Gait normal.      Deep Tendon Reflexes: Reflexes are normal and symmetric.   Psychiatric:         Attention and Perception: She is attentive.         Mood and Affect: Mood and affect normal.         Speech: Speech normal.         Behavior: Behavior normal.         Thought Content: Thought content normal.         No results found for this or any previous visit (from the past 672 hour(s)).      ASSESSMENT AND PLAN    Diagnoses and all orders for this visit:    1. Encounter for wellness examination in adult (Primary)        Preventative counseling completed including relevant screenings, appropriate vaccinations, healthy nutrition, and appropriate physical  activity.        The plan of care was discussed. All questions were answered. Patient verbalized understanding.

## 2023-08-11 ENCOUNTER — OFFICE VISIT (OUTPATIENT)
Dept: INTERNAL MEDICINE | Facility: CLINIC | Age: 28
End: 2023-08-11
Payer: MEDICAID

## 2023-08-11 ENCOUNTER — HOSPITAL ENCOUNTER (OUTPATIENT)
Dept: GENERAL RADIOLOGY | Facility: HOSPITAL | Age: 28
Discharge: HOME | End: 2023-08-11
Payer: MEDICAID

## 2023-08-11 VITALS
HEART RATE: 72 BPM | OXYGEN SATURATION: 99 % | BODY MASS INDEX: 29.47 KG/M2 | SYSTOLIC BLOOD PRESSURE: 110 MMHG | WEIGHT: 183.4 LBS | DIASTOLIC BLOOD PRESSURE: 76 MMHG | HEIGHT: 66 IN | TEMPERATURE: 97.7 F

## 2023-08-11 DIAGNOSIS — M79.601 PAIN OF RIGHT UPPER EXTREMITY: ICD-10-CM

## 2023-08-11 DIAGNOSIS — M25.511 CHRONIC RIGHT SHOULDER PAIN: Primary | ICD-10-CM

## 2023-08-11 DIAGNOSIS — F19.90 EXCESSIVE USE OF NONSTEROIDAL ANTI-INFLAMMATORY DRUG (NSAID): ICD-10-CM

## 2023-08-11 DIAGNOSIS — G89.29 CHRONIC RIGHT SHOULDER PAIN: Primary | ICD-10-CM

## 2023-08-11 DIAGNOSIS — G89.29 CHRONIC ELBOW PAIN, RIGHT: ICD-10-CM

## 2023-08-11 DIAGNOSIS — M25.521 CHRONIC ELBOW PAIN, RIGHT: ICD-10-CM

## 2023-08-11 PROCEDURE — 73080 X-RAY EXAM OF ELBOW: CPT

## 2023-08-11 PROCEDURE — 99214 OFFICE O/P EST MOD 30 MIN: CPT | Performed by: NURSE PRACTITIONER

## 2023-08-11 PROCEDURE — 73030 X-RAY EXAM OF SHOULDER: CPT

## 2023-08-11 PROCEDURE — 73060 X-RAY EXAM OF HUMERUS: CPT

## 2023-08-11 RX ORDER — MULTIPLE VITAMINS W/ MINERALS TAB 9MG-400MCG
1 TAB ORAL DAILY
COMMUNITY

## 2023-08-11 NOTE — PROGRESS NOTES
"Jewell Schmidt is a 28 y.o. female presenting today for   Chief Complaint   Patient presents with    Arm Pain     C/O arm pain, not sure if old injury       Subjective    Arm Pain   Pertinent negatives include no numbness.      Pain of the R UE, primarily in the elbow but also in the shoulder.   She attributes this to a fracture she suffered as a child.   There has been constant pain but increased over the past 2mo since she has been exercising more.  Sore. \"Feels like something is out of place.\"  Has tried Ibuprofen, tylenol. She has been taking very very high doses of advil.    The following portions of the patient's history were reviewed and updated as appropriate: allergies, current medications, problem list, past medical history, past surgical history, family history, and social history.    Review of Systems   Neurological:  Negative for weakness and numbness.       Objective    Vitals:    08/11/23 1030   BP: 110/76   BP Location: Left arm   Patient Position: Sitting   Cuff Size: Adult   Pulse: 72   Temp: 97.7 øF (36.5 øC)   TempSrc: Infrared   SpO2: 99%   Weight: 83.2 kg (183 lb 6.4 oz)   Height: 167.6 cm (66\")     Body mass index is 29.6 kg/mý.  Nursing notes and vitals reviewed.    Physical Exam  Constitutional:       General: She is not in acute distress.     Appearance: She is well-developed.   Pulmonary:      Effort: Pulmonary effort is normal.   Musculoskeletal:      Right shoulder: Crepitus present. No swelling, deformity or bony tenderness. Normal range of motion. Normal strength.      Right upper arm: Bony tenderness present. No swelling, edema or deformity.      Right elbow: No swelling or deformity. Normal range of motion.   Neurological:      Mental Status: She is alert.      Sensory: Sensation is intact.   Psychiatric:         Attention and Perception: She is attentive.         Speech: Speech normal.         Assessment and Plan    Diagnoses and all orders for this visit:    1. Chronic right " shoulder pain (Primary)  -     XR Shoulder 2+ View Right  -     diclofenac (VOLTAREN) 50 MG EC tablet; Take 1 tablet by mouth 2 (Two) Times a Day As Needed (joint pain) for up to 30 days.  Dispense: 60 tablet; Refill: 0  -     Ambulatory Referral to Physical Therapy Evaluate and treat    2. Chronic elbow pain, right  -     XR elbow 3+ vw right  -     diclofenac (VOLTAREN) 50 MG EC tablet; Take 1 tablet by mouth 2 (Two) Times a Day As Needed (joint pain) for up to 30 days.  Dispense: 60 tablet; Refill: 0  -     Ambulatory Referral to Physical Therapy Evaluate and treat  -     Ambulatory Referral to Occupational Therapy    3. Pain of right upper extremity  -     XR humerus right  -     diclofenac (VOLTAREN) 50 MG EC tablet; Take 1 tablet by mouth 2 (Two) Times a Day As Needed (joint pain) for up to 30 days.  Dispense: 60 tablet; Refill: 0  -     Ambulatory Referral to Physical Therapy Evaluate and treat  -     Ambulatory Referral to Occupational Therapy    4. Excessive use of nonsteroidal anti-inflammatory drug (NSAID)  -     Comprehensive Metabolic Panel            Medications, including side effects, were discussed with the patient. Patient verbalized understanding.  The plan of care was discussed. All questions were answered. Patient verbalized understanding.        Return if symptoms worsen or fail to improve.

## 2023-08-12 LAB
ALBUMIN SERPL-MCNC: 4.6 G/DL (ref 3.5–5.2)
ALBUMIN/GLOB SERPL: 2.1 G/DL
ALP SERPL-CCNC: 62 U/L (ref 39–117)
ALT SERPL-CCNC: 38 U/L (ref 1–33)
AST SERPL-CCNC: 27 U/L (ref 1–32)
BILIRUB SERPL-MCNC: 0.5 MG/DL (ref 0–1.2)
BUN SERPL-MCNC: 10 MG/DL (ref 6–20)
BUN/CREAT SERPL: 12.7 (ref 7–25)
CALCIUM SERPL-MCNC: 9.4 MG/DL (ref 8.6–10.5)
CHLORIDE SERPL-SCNC: 107 MMOL/L (ref 98–107)
CO2 SERPL-SCNC: 21.2 MMOL/L (ref 22–29)
CREAT SERPL-MCNC: 0.79 MG/DL (ref 0.57–1)
EGFRCR SERPLBLD CKD-EPI 2021: 106 ML/MIN/1.73
GLOBULIN SER CALC-MCNC: 2.2 GM/DL
GLUCOSE SERPL-MCNC: 79 MG/DL (ref 65–99)
POTASSIUM SERPL-SCNC: 4 MMOL/L (ref 3.5–5.2)
PROT SERPL-MCNC: 6.8 G/DL (ref 6–8.5)
SODIUM SERPL-SCNC: 140 MMOL/L (ref 136–145)

## 2023-10-29 ENCOUNTER — ANESTHESIA EVENT (OUTPATIENT)
Dept: PERIOP | Facility: HOSPITAL | Age: 28
End: 2023-10-29
Payer: MEDICAID

## 2023-10-29 ENCOUNTER — HOSPITAL ENCOUNTER (OUTPATIENT)
Facility: HOSPITAL | Age: 28
Discharge: HOME | End: 2023-10-29
Attending: EMERGENCY MEDICINE | Admitting: SURGERY
Payer: MEDICAID

## 2023-10-29 ENCOUNTER — APPOINTMENT (OUTPATIENT)
Dept: CT IMAGING | Facility: HOSPITAL | Age: 28
End: 2023-10-29
Payer: MEDICAID

## 2023-10-29 ENCOUNTER — APPOINTMENT (OUTPATIENT)
Dept: GENERAL RADIOLOGY | Facility: HOSPITAL | Age: 28
End: 2023-10-29
Payer: MEDICAID

## 2023-10-29 ENCOUNTER — ANESTHESIA (OUTPATIENT)
Dept: PERIOP | Facility: HOSPITAL | Age: 28
End: 2023-10-29
Payer: MEDICAID

## 2023-10-29 VITALS
BODY MASS INDEX: 29.73 KG/M2 | RESPIRATION RATE: 15 BRPM | SYSTOLIC BLOOD PRESSURE: 109 MMHG | OXYGEN SATURATION: 99 % | DIASTOLIC BLOOD PRESSURE: 70 MMHG | WEIGHT: 185 LBS | HEART RATE: 89 BPM | TEMPERATURE: 98.2 F | HEIGHT: 66 IN

## 2023-10-29 DIAGNOSIS — K80.00 ACUTE CHOLECYSTITIS DUE TO BILIARY CALCULUS: ICD-10-CM

## 2023-10-29 DIAGNOSIS — K81.9 CHOLECYSTITIS: Primary | ICD-10-CM

## 2023-10-29 LAB
ALBUMIN SERPL-MCNC: 4.3 G/DL (ref 3.5–5.2)
ALBUMIN/GLOB SERPL: 1.4 G/DL
ALP SERPL-CCNC: 84 U/L (ref 39–117)
ALT SERPL W P-5'-P-CCNC: 13 U/L (ref 1–33)
ANION GAP SERPL CALCULATED.3IONS-SCNC: 10.6 MMOL/L (ref 5–15)
AST SERPL-CCNC: 17 U/L (ref 1–32)
B-HCG UR QL: NEGATIVE
BASOPHILS # BLD AUTO: 0.03 10*3/MM3 (ref 0–0.2)
BASOPHILS NFR BLD AUTO: 0.2 % (ref 0–1.5)
BILIRUB SERPL-MCNC: 0.3 MG/DL (ref 0–1.2)
BILIRUB UR QL STRIP: NEGATIVE
BUN SERPL-MCNC: 9 MG/DL (ref 6–20)
BUN/CREAT SERPL: 14.1 (ref 7–25)
CALCIUM SPEC-SCNC: 8.5 MG/DL (ref 8.6–10.5)
CHLORIDE SERPL-SCNC: 105 MMOL/L (ref 98–107)
CLARITY UR: CLEAR
CO2 SERPL-SCNC: 20.4 MMOL/L (ref 22–29)
COLOR UR: YELLOW
CREAT SERPL-MCNC: 0.64 MG/DL (ref 0.57–1)
D-LACTATE SERPL-SCNC: 1.8 MMOL/L (ref 0.5–2)
DEPRECATED RDW RBC AUTO: 45.7 FL (ref 37–54)
EGFRCR SERPLBLD CKD-EPI 2021: 125.2 ML/MIN/1.73
EOSINOPHIL # BLD AUTO: 0.06 10*3/MM3 (ref 0–0.4)
EOSINOPHIL NFR BLD AUTO: 0.4 % (ref 0.3–6.2)
ERYTHROCYTE [DISTWIDTH] IN BLOOD BY AUTOMATED COUNT: 13.5 % (ref 12.3–15.4)
GLOBULIN UR ELPH-MCNC: 3.1 GM/DL
GLUCOSE SERPL-MCNC: 99 MG/DL (ref 65–99)
GLUCOSE UR STRIP-MCNC: NEGATIVE MG/DL
HCT VFR BLD AUTO: 39.3 % (ref 34–46.6)
HGB BLD-MCNC: 12.8 G/DL (ref 12–15.9)
HGB UR QL STRIP.AUTO: NEGATIVE
HOLD SPECIMEN: NORMAL
HOLD SPECIMEN: NORMAL
IMM GRANULOCYTES # BLD AUTO: 0.03 10*3/MM3 (ref 0–0.05)
IMM GRANULOCYTES NFR BLD AUTO: 0.2 % (ref 0–0.5)
KETONES UR QL STRIP: NEGATIVE
LEUKOCYTE ESTERASE UR QL STRIP.AUTO: NEGATIVE
LIPASE SERPL-CCNC: 20 U/L (ref 13–60)
LYMPHOCYTES # BLD AUTO: 2.81 10*3/MM3 (ref 0.7–3.1)
LYMPHOCYTES NFR BLD AUTO: 16.6 % (ref 19.6–45.3)
MCH RBC QN AUTO: 29.4 PG (ref 26.6–33)
MCHC RBC AUTO-ENTMCNC: 32.6 G/DL (ref 31.5–35.7)
MCV RBC AUTO: 90.1 FL (ref 79–97)
MONOCYTES # BLD AUTO: 0.4 10*3/MM3 (ref 0.1–0.9)
MONOCYTES NFR BLD AUTO: 2.4 % (ref 5–12)
NEUTROPHILS NFR BLD AUTO: 13.58 10*3/MM3 (ref 1.7–7)
NEUTROPHILS NFR BLD AUTO: 80.2 % (ref 42.7–76)
NITRITE UR QL STRIP: NEGATIVE
PH UR STRIP.AUTO: 6 [PH] (ref 4.5–8)
PLATELET # BLD AUTO: 315 10*3/MM3 (ref 140–450)
PMV BLD AUTO: 10.4 FL (ref 6–12)
POTASSIUM SERPL-SCNC: 3.8 MMOL/L (ref 3.5–5.2)
PROT SERPL-MCNC: 7.4 G/DL (ref 6–8.5)
PROT UR QL STRIP: NEGATIVE
RBC # BLD AUTO: 4.36 10*6/MM3 (ref 3.77–5.28)
SODIUM SERPL-SCNC: 136 MMOL/L (ref 136–145)
SP GR UR STRIP: 1.03 (ref 1–1.03)
UROBILINOGEN UR QL STRIP: NORMAL
WBC NRBC COR # BLD: 16.91 10*3/MM3 (ref 3.4–10.8)
WHOLE BLOOD HOLD COAG: NORMAL
WHOLE BLOOD HOLD SPECIMEN: NORMAL

## 2023-10-29 PROCEDURE — 25010000002 PIPERACILLIN SOD-TAZOBACTAM PER 1 G: Performed by: EMERGENCY MEDICINE

## 2023-10-29 PROCEDURE — 25010000002 KETOROLAC TROMETHAMINE PER 15 MG: Performed by: ANESTHESIOLOGY

## 2023-10-29 PROCEDURE — 83690 ASSAY OF LIPASE: CPT | Performed by: EMERGENCY MEDICINE

## 2023-10-29 PROCEDURE — 96375 TX/PRO/DX INJ NEW DRUG ADDON: CPT

## 2023-10-29 PROCEDURE — 25510000001 IOPAMIDOL 61 % SOLUTION 30 ML VIAL: Performed by: SURGERY

## 2023-10-29 PROCEDURE — 81003 URINALYSIS AUTO W/O SCOPE: CPT | Performed by: EMERGENCY MEDICINE

## 2023-10-29 PROCEDURE — 88304 TISSUE EXAM BY PATHOLOGIST: CPT | Performed by: SURGERY

## 2023-10-29 PROCEDURE — 25010000002 ONDANSETRON PER 1 MG: Performed by: ANESTHESIOLOGY

## 2023-10-29 PROCEDURE — 25810000003 LACTATED RINGERS PER 1000 ML: Performed by: ANESTHESIOLOGY

## 2023-10-29 PROCEDURE — 25010000002 DEXAMETHASONE PER 1 MG: Performed by: ANESTHESIOLOGY

## 2023-10-29 PROCEDURE — 74300 X-RAY BILE DUCTS/PANCREAS: CPT

## 2023-10-29 PROCEDURE — 74176 CT ABD & PELVIS W/O CONTRAST: CPT

## 2023-10-29 PROCEDURE — 25010000002 SUGAMMADEX 200 MG/2ML SOLUTION: Performed by: ANESTHESIOLOGY

## 2023-10-29 PROCEDURE — 25810000003 LACTATED RINGERS SOLUTION: Performed by: EMERGENCY MEDICINE

## 2023-10-29 PROCEDURE — 47563 LAPARO CHOLECYSTECTOMY/GRAPH: CPT | Performed by: SURGERY

## 2023-10-29 PROCEDURE — 25010000002 MORPHINE PER 10 MG: Performed by: EMERGENCY MEDICINE

## 2023-10-29 PROCEDURE — 85025 COMPLETE CBC W/AUTO DIFF WBC: CPT | Performed by: EMERGENCY MEDICINE

## 2023-10-29 PROCEDURE — 96376 TX/PRO/DX INJ SAME DRUG ADON: CPT

## 2023-10-29 PROCEDURE — 99222 1ST HOSP IP/OBS MODERATE 55: CPT | Performed by: SURGERY

## 2023-10-29 PROCEDURE — 25010000002 SUCCINYLCHOLINE PER 20 MG: Performed by: ANESTHESIOLOGY

## 2023-10-29 PROCEDURE — 25010000002 ONDANSETRON PER 1 MG: Performed by: EMERGENCY MEDICINE

## 2023-10-29 PROCEDURE — 25010000002 FENTANYL CITRATE (PF) 50 MCG/ML SOLUTION: Performed by: ANESTHESIOLOGY

## 2023-10-29 PROCEDURE — 25010000002 MIDAZOLAM PER 1MG: Performed by: ANESTHESIOLOGY

## 2023-10-29 PROCEDURE — 83605 ASSAY OF LACTIC ACID: CPT | Performed by: EMERGENCY MEDICINE

## 2023-10-29 PROCEDURE — 99284 EMERGENCY DEPT VISIT MOD MDM: CPT

## 2023-10-29 PROCEDURE — 25010000002 PROPOFOL 200 MG/20ML EMULSION: Performed by: ANESTHESIOLOGY

## 2023-10-29 PROCEDURE — 81025 URINE PREGNANCY TEST: CPT | Performed by: EMERGENCY MEDICINE

## 2023-10-29 PROCEDURE — 80053 COMPREHEN METABOLIC PANEL: CPT | Performed by: EMERGENCY MEDICINE

## 2023-10-29 PROCEDURE — 71046 X-RAY EXAM CHEST 2 VIEWS: CPT

## 2023-10-29 PROCEDURE — 96365 THER/PROPH/DIAG IV INF INIT: CPT

## 2023-10-29 DEVICE — CLIP LIGAT VASC HORIZON TI MD/LG GRN 6CT: Type: IMPLANTABLE DEVICE | Site: ABDOMEN | Status: FUNCTIONAL

## 2023-10-29 RX ORDER — OXYCODONE HYDROCHLORIDE AND ACETAMINOPHEN 5; 325 MG/1; MG/1
1 TABLET ORAL EVERY 6 HOURS PRN
Qty: 10 TABLET | Refills: 0 | Status: SHIPPED | OUTPATIENT
Start: 2023-10-29

## 2023-10-29 RX ORDER — SODIUM CHLORIDE 0.9 % (FLUSH) 0.9 %
10 SYRINGE (ML) INJECTION AS NEEDED
Status: DISCONTINUED | OUTPATIENT
Start: 2023-10-29 | End: 2023-10-29 | Stop reason: HOSPADM

## 2023-10-29 RX ORDER — KETOROLAC TROMETHAMINE 30 MG/ML
INJECTION, SOLUTION INTRAMUSCULAR; INTRAVENOUS AS NEEDED
Status: DISCONTINUED | OUTPATIENT
Start: 2023-10-29 | End: 2023-10-29 | Stop reason: SURG

## 2023-10-29 RX ORDER — BUPIVACAINE HYDROCHLORIDE AND EPINEPHRINE 5; 5 MG/ML; UG/ML
INJECTION, SOLUTION EPIDURAL; INTRACAUDAL; PERINEURAL AS NEEDED
Status: DISCONTINUED | OUTPATIENT
Start: 2023-10-29 | End: 2023-10-29 | Stop reason: HOSPADM

## 2023-10-29 RX ORDER — LIDOCAINE HYDROCHLORIDE 20 MG/ML
INJECTION, SOLUTION INFILTRATION; PERINEURAL AS NEEDED
Status: DISCONTINUED | OUTPATIENT
Start: 2023-10-29 | End: 2023-10-29 | Stop reason: SURG

## 2023-10-29 RX ORDER — ONDANSETRON 4 MG/1
4 TABLET, FILM COATED ORAL EVERY 6 HOURS PRN
Qty: 10 TABLET | Refills: 0 | Status: SHIPPED | OUTPATIENT
Start: 2023-10-29

## 2023-10-29 RX ORDER — SUCCINYLCHOLINE CHLORIDE 20 MG/ML
INJECTION INTRAMUSCULAR; INTRAVENOUS AS NEEDED
Status: DISCONTINUED | OUTPATIENT
Start: 2023-10-29 | End: 2023-10-29 | Stop reason: SURG

## 2023-10-29 RX ORDER — GLYCOPYRROLATE 0.2 MG/ML
INJECTION INTRAMUSCULAR; INTRAVENOUS AS NEEDED
Status: DISCONTINUED | OUTPATIENT
Start: 2023-10-29 | End: 2023-10-29 | Stop reason: SURG

## 2023-10-29 RX ORDER — DEXAMETHASONE SODIUM PHOSPHATE 4 MG/ML
8 INJECTION, SOLUTION INTRA-ARTICULAR; INTRALESIONAL; INTRAMUSCULAR; INTRAVENOUS; SOFT TISSUE ONCE AS NEEDED
Status: COMPLETED | OUTPATIENT
Start: 2023-10-29 | End: 2023-10-29

## 2023-10-29 RX ORDER — FAMOTIDINE 10 MG/ML
20 INJECTION, SOLUTION INTRAVENOUS
Status: COMPLETED | OUTPATIENT
Start: 2023-10-29 | End: 2023-10-29

## 2023-10-29 RX ORDER — ONDANSETRON 2 MG/ML
4 INJECTION INTRAMUSCULAR; INTRAVENOUS ONCE
Status: COMPLETED | OUTPATIENT
Start: 2023-10-29 | End: 2023-10-29

## 2023-10-29 RX ORDER — SODIUM CHLORIDE, SODIUM LACTATE, POTASSIUM CHLORIDE, CALCIUM CHLORIDE 600; 310; 30; 20 MG/100ML; MG/100ML; MG/100ML; MG/100ML
9 INJECTION, SOLUTION INTRAVENOUS CONTINUOUS PRN
Status: DISCONTINUED | OUTPATIENT
Start: 2023-10-29 | End: 2023-10-29 | Stop reason: HOSPADM

## 2023-10-29 RX ORDER — MIDAZOLAM HYDROCHLORIDE 2 MG/2ML
1 INJECTION, SOLUTION INTRAMUSCULAR; INTRAVENOUS
Status: COMPLETED | OUTPATIENT
Start: 2023-10-29 | End: 2023-10-29

## 2023-10-29 RX ORDER — ONDANSETRON 2 MG/ML
4 INJECTION INTRAMUSCULAR; INTRAVENOUS ONCE AS NEEDED
Status: COMPLETED | OUTPATIENT
Start: 2023-10-29 | End: 2023-10-29

## 2023-10-29 RX ORDER — MAGNESIUM HYDROXIDE 1200 MG/15ML
LIQUID ORAL AS NEEDED
Status: DISCONTINUED | OUTPATIENT
Start: 2023-10-29 | End: 2023-10-29 | Stop reason: HOSPADM

## 2023-10-29 RX ORDER — SODIUM CHLORIDE, SODIUM LACTATE, POTASSIUM CHLORIDE, CALCIUM CHLORIDE 600; 310; 30; 20 MG/100ML; MG/100ML; MG/100ML; MG/100ML
100 INJECTION, SOLUTION INTRAVENOUS CONTINUOUS
Status: DISCONTINUED | OUTPATIENT
Start: 2023-10-29 | End: 2023-10-29 | Stop reason: HOSPADM

## 2023-10-29 RX ORDER — OXYCODONE HYDROCHLORIDE AND ACETAMINOPHEN 5; 325 MG/1; MG/1
1 TABLET ORAL ONCE AS NEEDED
Status: COMPLETED | OUTPATIENT
Start: 2023-10-29 | End: 2023-10-29

## 2023-10-29 RX ORDER — FENTANYL CITRATE 50 UG/ML
INJECTION, SOLUTION INTRAMUSCULAR; INTRAVENOUS AS NEEDED
Status: DISCONTINUED | OUTPATIENT
Start: 2023-10-29 | End: 2023-10-29 | Stop reason: SURG

## 2023-10-29 RX ORDER — ROCURONIUM BROMIDE 10 MG/ML
INJECTION, SOLUTION INTRAVENOUS AS NEEDED
Status: DISCONTINUED | OUTPATIENT
Start: 2023-10-29 | End: 2023-10-29 | Stop reason: SURG

## 2023-10-29 RX ORDER — PROPOFOL 10 MG/ML
INJECTION, EMULSION INTRAVENOUS AS NEEDED
Status: DISCONTINUED | OUTPATIENT
Start: 2023-10-29 | End: 2023-10-29 | Stop reason: SURG

## 2023-10-29 RX ORDER — KETAMINE HYDROCHLORIDE 10 MG/ML
INJECTION INTRAMUSCULAR; INTRAVENOUS AS NEEDED
Status: DISCONTINUED | OUTPATIENT
Start: 2023-10-29 | End: 2023-10-29 | Stop reason: SURG

## 2023-10-29 RX ADMIN — SUGAMMADEX 200 MG: 100 INJECTION, SOLUTION INTRAVENOUS at 15:56

## 2023-10-29 RX ADMIN — FAMOTIDINE 20 MG: 10 INJECTION, SOLUTION INTRAVENOUS at 14:58

## 2023-10-29 RX ADMIN — ONDANSETRON 4 MG: 2 INJECTION INTRAMUSCULAR; INTRAVENOUS at 16:38

## 2023-10-29 RX ADMIN — DEXAMETHASONE SODIUM PHOSPHATE 8 MG: 4 INJECTION, SOLUTION INTRAMUSCULAR; INTRAVENOUS at 14:58

## 2023-10-29 RX ADMIN — PIPERACILLIN SODIUM AND TAZOBACTAM SODIUM 3.38 G: 3; .375 INJECTION, POWDER, LYOPHILIZED, FOR SOLUTION INTRAVENOUS at 12:38

## 2023-10-29 RX ADMIN — ROCURONIUM BROMIDE 5 MG: 10 INJECTION, SOLUTION INTRAVENOUS at 15:12

## 2023-10-29 RX ADMIN — MORPHINE SULFATE 4 MG: 4 INJECTION, SOLUTION INTRAMUSCULAR; INTRAVENOUS at 12:38

## 2023-10-29 RX ADMIN — ONDANSETRON 4 MG: 2 INJECTION INTRAMUSCULAR; INTRAVENOUS at 11:01

## 2023-10-29 RX ADMIN — MIDAZOLAM HYDROCHLORIDE 1 MG: 1 INJECTION, SOLUTION INTRAMUSCULAR; INTRAVENOUS at 15:08

## 2023-10-29 RX ADMIN — GLYCOPYRROLATE 0.1 MG: 0.2 INJECTION INTRAMUSCULAR; INTRAVENOUS at 15:11

## 2023-10-29 RX ADMIN — PROPOFOL 150 MG: 10 INJECTION, EMULSION INTRAVENOUS at 15:14

## 2023-10-29 RX ADMIN — SODIUM CHLORIDE, POTASSIUM CHLORIDE, SODIUM LACTATE AND CALCIUM CHLORIDE 1000 ML: 600; 310; 30; 20 INJECTION, SOLUTION INTRAVENOUS at 11:01

## 2023-10-29 RX ADMIN — SODIUM CHLORIDE, POTASSIUM CHLORIDE, SODIUM LACTATE AND CALCIUM CHLORIDE 9 ML/HR: 600; 310; 30; 20 INJECTION, SOLUTION INTRAVENOUS at 15:01

## 2023-10-29 RX ADMIN — KETAMINE HYDROCHLORIDE 20 MG: 10 INJECTION INTRAMUSCULAR; INTRAVENOUS at 15:15

## 2023-10-29 RX ADMIN — ROCURONIUM BROMIDE 25 MG: 10 INJECTION, SOLUTION INTRAVENOUS at 15:24

## 2023-10-29 RX ADMIN — LIDOCAINE HYDROCHLORIDE 80 MG: 20 INJECTION, SOLUTION INFILTRATION; PERINEURAL at 15:14

## 2023-10-29 RX ADMIN — OXYCODONE HYDROCHLORIDE AND ACETAMINOPHEN 1 TABLET: 5; 325 TABLET ORAL at 16:36

## 2023-10-29 RX ADMIN — FENTANYL CITRATE 50 MCG: 50 INJECTION, SOLUTION INTRAMUSCULAR; INTRAVENOUS at 15:14

## 2023-10-29 RX ADMIN — MIDAZOLAM HYDROCHLORIDE 1 MG: 1 INJECTION, SOLUTION INTRAMUSCULAR; INTRAVENOUS at 15:01

## 2023-10-29 RX ADMIN — SUCCINYLCHOLINE CHLORIDE 120 MG: 20 INJECTION, SOLUTION INTRAMUSCULAR; INTRAVENOUS at 15:14

## 2023-10-29 RX ADMIN — KETOROLAC TROMETHAMINE 30 MG: 30 INJECTION, SOLUTION INTRAMUSCULAR; INTRAVENOUS at 15:42

## 2023-10-29 RX ADMIN — MORPHINE SULFATE 4 MG: 4 INJECTION, SOLUTION INTRAMUSCULAR; INTRAVENOUS at 11:02

## 2023-10-29 RX ADMIN — ONDANSETRON 4 MG: 2 INJECTION INTRAMUSCULAR; INTRAVENOUS at 14:58

## 2023-10-29 NOTE — DISCHARGE INSTRUCTIONS
Dr. Aaron Scott  [address]  [place] [id number] [phone]    Discharge Instructions for Gall Bladder Surgery    Go home, rest and take it easy today; however, you should get up and move about several times today to reduce the risk of developing a clot in your legs.      You may experience some dizziness or memory loss from the anesthesia.  This may last for the next 24 hours.  Someone should plan on staying with you for the first 24 hours for your safety.    Do not make any important legal decisions or sign any legal papers for the next 24 hours.      Eat and drink lightly today.  Start off with liquids, jello, soup, crackers or other bland foods at first. You may advance your diet tomorrow as tolerated as long as you do not experience any nausea or vomiting.     If skin glue (Dermabond) was used, your incisions are protected and covered.  The invisible glue will dissolve on its own as your incision heals. If you have dressings, you may remove your outer dressings in 3 days.  The white tapes called steri-strips should stay in place.  They will fall off on their own in 1-2 weeks.  Do not worry if they come off sooner.      If you have dressings, you may notice some bleeding/drainage on your outer dressings. A little bloody drainage is normal. If the bleeding/drainage is such that the bandage cannot absorb it, remove the dressing, apply clean gauze and apply firm pressure for a full 15 minutes.  If the bleeding continues, please call me.    You may shower tomorrow allowing water to run over the incisions; however, do not scrub the incisions.   No tub baths until your incisions are completely healed.         You have received a prescription for a narcotic pain medicine, as you will have some pain following surgery.   You will not be totally pain free, but your pain medicine should make the pain tolerable.  Please take your pain medicine as prescribed and always take your pills with food to  prevent nausea. If you are having severe pain that cannot be controlled by the pain medicine, please contact me.      You have also received a prescription for an anti-nausea medicine.  Please take this as prescribed for any nausea or vomiting.  Nausea could be a result of the anesthesia or a result of the narcotic pain medicine.  If you experience severe nausea and vomiting that cannot be controlled by the nausea medicine, please call me.      It is not unusual to experience pain/discomfort in your shoulders or under your ribs after surgery.  It is from the gas used during the laparoscopic procedure and usually lasts 1-3 days.  The prescription pain medicine is used to treat the surgical pain and does not typically alleviate this “gassy” pain.     No driving for 24 hours and for as long as you are taking your prescription pain medicine.      You will need to call the office at [phone] to schedule a follow-up appointment in 6-10 days.     Remember to contact me for any of the following:    Fever > 101 degrees  Severe pain that cannot be controlled by taking your pain pills  Severe nausea or vomiting that cannot be controlled by taking your nausea pills  Significant bleeding of your incisions  Drainage that has a bad smell or is yellow or green in appearance  Any other questions or concerns

## 2023-10-29 NOTE — ANESTHESIA PREPROCEDURE EVALUATION
Anesthesia Evaluation     Patient summary reviewed and Nursing notes reviewed   NPO Solid Status: > 8 hours  NPO Liquid Status: > 6 hours           Airway   Mallampati: I  TM distance: >3 FB  Neck ROM: full  No difficulty expected  Dental - normal exam     Pulmonary - normal exam    breath sounds clear to auscultation  (+) a smoker (vapes, no cigarettes for 3 years) Former,Asthma: denies.  Cardiovascular - normal exam  Exercise tolerance: good (4-7 METS)    Rhythm: regular  Rate: normal        Neuro/Psych- negative ROS  GI/Hepatic/Renal/Endo    (+) GERD (TUMS)    Musculoskeletal     Abdominal  - normal exam   Substance History - negative use     OB/GYN negative ob/gyn ROS         Other   arthritis (elbows),                 Anesthesia Plan    ASA 2 - emergent     general     (Patient denies allergy to ibuprofen, was told by PCP due to kiney function, but function normal. Discussion with patient and Dr Scott, will use toradol today.)  intravenous induction     Anesthetic plan, risks, benefits, and alternatives have been provided, discussed and informed consent has been obtained with: patient.    Use of blood products discussed with patient  Consented to blood products.      CODE STATUS:

## 2023-10-29 NOTE — ED NOTES
"Nursing report ED to floor  Jewell Schmidt  28 y.o.  female    HPI :   Chief Complaint   Patient presents with    Abdominal Pain    Back Pain       Admitting doctor:   No admitting provider for patient encounter.    Admitting diagnosis:   The encounter diagnosis was Cholecystitis.    Code status:   Current Code Status       Date Active Code Status Order ID Comments User Context       Prior            Allergies:   Ibuprofen    Isolation:   No active isolations    Intake and Output  No intake or output data in the 24 hours ending 10/29/23 1253    Weight:       10/29/23  1026   Weight: 83.9 kg (185 lb)       Most recent vitals:   Vitals:    10/29/23 1026 10/29/23 1130 10/29/23 1241   BP: 111/74 114/78 103/61   Pulse: 80 83 70   Resp: 16 16 16   Temp: 98 °F (36.7 °C)     SpO2: 98% 98% 100%   Weight: 83.9 kg (185 lb)     Height: 167.6 cm (66\")         Active LDAs/IV Access:   Lines, Drains & Airways       Active LDAs       Name Placement date Placement time Site Days    Peripheral IV 10/29/23 1031 Right Antecubital 10/29/23  1031  Antecubital  less than 1                    Labs (abnormal labs have a star):   Labs Reviewed   COMPREHENSIVE METABOLIC PANEL - Abnormal; Notable for the following components:       Result Value    CO2 20.4 (*)     Calcium 8.5 (*)     All other components within normal limits    Narrative:     GFR Normal >60  Chronic Kidney Disease <60  Kidney Failure <15     CBC WITH AUTO DIFFERENTIAL - Abnormal; Notable for the following components:    WBC 16.91 (*)     Neutrophil % 80.2 (*)     Lymphocyte % 16.6 (*)     Monocyte % 2.4 (*)     Neutrophils, Absolute 13.58 (*)     All other components within normal limits   LIPASE - Normal   URINALYSIS W/ MICROSCOPIC IF INDICATED (NO CULTURE) - Normal    Narrative:     Urine microscopic not indicated.   PREGNANCY, URINE - Normal   RAINBOW DRAW    Narrative:     The following orders were created for panel order [place] Draw.  Procedure                        "        Abnormality         Status                     ---------                               -----------         ------                     Green Top (Gel)[[id number]]                                  Final result               Lavender Top[[id number]]                                     Final result               Gold Top - SST[[id number]]                                   Final result               Light Blue Top[[id number]]                                   Final result                 Please view results for these tests on the individual orders.   LACTIC ACID, PLASMA   CBC AND DIFFERENTIAL    Narrative:     The following orders were created for panel order CBC & Differential.  Procedure                               Abnormality         Status                     ---------                               -----------         ------                     CBC Auto Differential[[id number]]        Abnormal            Final result                 Please view results for these tests on the individual orders.   GREEN TOP   LAVENDER TOP   GOLD TOP - SST   LIGHT BLUE TOP       EKG:   No orders to display       Meds given in ED:   Medications   sodium chloride 0.9 % flush 10 mL (has no administration in time range)   piperacillin-tazobactam (ZOSYN) IVPB 3.375 g in 100 mL NS (CD) (3.375 g Intravenous New Bag 10/29/23 1238)   lactated ringers bolus 1,000 mL (1,000 mL Intravenous New Bag 10/29/23 1101)   morphine injection 4 mg (4 mg Intravenous Given 10/29/23 1102)   ondansetron (ZOFRAN) injection 4 mg (4 mg Intravenous Given 10/29/23 1101)   morphine injection 4 mg (4 mg Intravenous Given 10/29/23 1238)       Imaging results:  CT Abdomen Pelvis Without Contrast    Result Date: 10/29/2023  1. The gallbladder demonstrates multiple small gallstones in the dependent portion of the gallbladder. The gallbladder is mildly distended.  2. No evidence of renal or ureteral calculi. No hydronephrosis. No evidence of appendicitis.   This report  was finalized on 10/29/2023 11:58 AM by Dr. Raciel Piesron MD.      XR Chest 2 View    Result Date: 10/29/2023  No acute cardiopulmonary pathology.   This report was finalized on 10/29/2023 11:49 AM by Dr. Raciel Pierson MD.       Ambulatory status:   - ambulates without assistance.     Social issues:   Social History     Socioeconomic History    Marital status:    Tobacco Use    Smoking status: Former     Packs/day: 0.25     Years: 3.00     Additional pack years: 0.00     Total pack years: 0.75     Types: Cigarettes     Quit date: 2020     Years since quitting: 3.8    Smokeless tobacco: Never   Vaping Use    Vaping Use: Some days    Substances: Nicotine, CBD    Devices: Disposable   Substance and Sexual Activity    Alcohol use: No    Drug use: Not Currently     Types: Marijuana     Comment: hx. quit 1/1/19    Sexual activity: Yes     Partners: Male     Birth control/protection: Natural family planning/Rhythm       NIH Stroke Scale:         Francisca Ingram RN  10/29/23 12:53 EDT

## 2023-10-29 NOTE — ANESTHESIA POSTPROCEDURE EVALUATION
Patient: Jewell Schmidt    Procedure Summary       Date: 10/29/23 Room / Location:  [facility] 2 /  [facility]    Anesthesia Start: 1508 Anesthesia Stop: 1612    Procedure: CHOLECYSTECTOMY LAPAROSCOPIC INTRAOPERATIVE CHOLANGIOGRAM (Abdomen) Diagnosis:       Cholecystitis      (Cholecystitis [K81.9])    Surgeons: Aaron Scott MD Provider: Chasidy Hampton MD    Anesthesia Type: general ASA Status: 2 - Emergent            Anesthesia Type: general    Vitals  Vitals Value Taken Time   /70 10/29/23 1645   Temp 98.2 °F (36.8 °C) 10/29/23 1625   Pulse 70 10/29/23 1647   Resp 15 10/29/23 1640   SpO2 100 % 10/29/23 1647   Vitals shown include unfiled device data.        Post Anesthesia Care and Evaluation    Patient location during evaluation: bedside  Patient participation: complete - patient participated  Level of consciousness: awake and alert  Pain score: 0  Pain management: adequate    Airway patency: patent  Anesthetic complications: No anesthetic complications  PONV Status: none  Cardiovascular status: acceptable  Respiratory status: acceptable  Hydration status: acceptable  No anesthesia care post op

## 2023-10-29 NOTE — ED PROVIDER NOTES
Subjective   History of Present Illness  28-year-old female no significant past medical history presents emergency room complaining of 1 days worth of right upper quadrant abdominal pain rating into her back that she states is worse with palpation and certain movements and with deep breaths.  Last menstrual period was October 14.  Last meal was yesterday afternoon.  Patient states she has had nausea and vomiting since yesterday afternoon and that she has had multiple bowel movements and that although they have been normal they have been more frequent through the evening last night and into this morning.  Patient denies headache fever visual changes neck pain jaw pain cough sore throat chest pain or dysuria.  Patient states she has pain in her right upper quadrant of abdomen whenever she takes a deep breath.      Review of Systems   Constitutional:  Negative for activity change, appetite change, chills, diaphoresis, fatigue and fever.   HENT:  Negative for congestion, rhinorrhea and sore throat.    Eyes:  Negative for photophobia and visual disturbance.   Respiratory:  Negative for cough and shortness of breath.    Cardiovascular:  Negative for chest pain, palpitations and leg swelling.   Gastrointestinal:  Positive for abdominal pain, diarrhea, nausea and vomiting. Negative for abdominal distention.   Genitourinary:  Negative for dysuria and flank pain.   Musculoskeletal:  Negative for arthralgias and back pain.   Skin:  Negative for rash.   Neurological:  Negative for dizziness, weakness and headaches.   Psychiatric/Behavioral:  Negative for agitation and behavioral problems.        Past Medical History:   Diagnosis Date    Asthma     Chlamydia     Substance abuse     marijauna use    Trauma        Allergies   Allergen Reactions    Ibuprofen Unknown - High Severity       Past Surgical History:   Procedure Laterality Date    FRACTURE SURGERY Bilateral 2004    elbows       Family History   Problem Relation Age of  Onset    Obesity Mother     Heart disease Father     Arthritis Father     Stroke Father          from a stroke    Liver disease Father     Obesity Maternal Aunt     Obesity Maternal Uncle     Heart disease Maternal Grandmother     Obesity Maternal Grandmother        Social History     Socioeconomic History    Marital status:    Tobacco Use    Smoking status: Former     Packs/day: 0.25     Years: 3.00     Additional pack years: 0.00     Total pack years: 0.75     Types: Cigarettes     Quit date:      Years since quitting: 3.8    Smokeless tobacco: Never   Vaping Use    Vaping Use: Some days    Substances: Nicotine, CBD    Devices: Disposable   Substance and Sexual Activity    Alcohol use: No    Drug use: Not Currently     Types: Marijuana     Comment: hx. quit 21    Sexual activity: Yes     Partners: Male     Birth control/protection: Natural family planning/Rhythm           Objective   Physical Exam  Vitals and nursing note reviewed.   Constitutional:       General: She is not in acute distress.     Appearance: Normal appearance. She is not ill-appearing, toxic-appearing or diaphoretic.   HENT:      Head: Normocephalic and atraumatic.      Nose: Nose normal.      Mouth/Throat:      Mouth: Mucous membranes are moist.   Eyes:      Conjunctiva/sclera: Conjunctivae normal.   Cardiovascular:      Rate and Rhythm: Normal rate and regular rhythm.      Pulses: Normal pulses.   Pulmonary:      Effort: Pulmonary effort is normal.      Breath sounds: Normal breath sounds.   Abdominal:      General: Abdomen is flat. There is no distension.      Tenderness: There is abdominal tenderness in the right upper quadrant. There is guarding and rebound. There is no right CVA tenderness or left CVA tenderness.      Hernia: No hernia is present.   Musculoskeletal:         General: No swelling. Normal range of motion.      Cervical back: Normal range of motion and neck supple.   Skin:     General: Skin is warm and dry.    Neurological:      General: No focal deficit present.      Mental Status: She is alert and oriented to person, place, and time.   Psychiatric:         Mood and Affect: Mood normal.         Procedures           ED Course  ED Course as of 10/29/23 1234   Sun Oct 29, 2023   1202 CT abdomen pelvis:  IMPRESSION:  1. The gallbladder demonstrates multiple small gallstones in the  dependent portion of the gallbladder. The gallbladder is mildly  distended.     2. No evidence of renal or ureteral calculi. No hydronephrosis. No  evidence of appendicitis.        This report was finalized on 10/29/2023 11:58 AM by Dr. Raciel Pierson MD.   []   1203 Chest x-ray:  IMPRESSION:  No acute cardiopulmonary pathology.        This report was finalized on 10/29/2023 11:49 AM by Dr. Raciel Pierson MD.      [BH]   1203 Patient's LFTs are within normal limits however patient's WBCs are 17,000 and her CT scan shows gallstones and a mildly distended gallbladder.  Patient continues to have right upper quadrant tenderness with palpation.  Will speak with general surgery. []   1224 BUN: 9 [BH]   1226 Lipase: 20  Spoke with Dr. Scott for general surgery who stated he will call the OR and get her set up for surgery []      ED Course User Index  [] Oniel Duncan MD                                           Medical Decision Making  My differential diagnosis for abdominal pain includes but is not limited to:  Gastritis, gastroenteritis, peptic ulcer disease, GERD, esophageal perforation, acute appendicitis, mesenteric adenitis, Meckel's diverticulum, epiploic appendagitis, diverticulitis, colon cancer, ulcerative colitis, Crohn's disease, intussusception, small bowel obstruction, adhesions, ischemic bowel, perforated viscus, ileus, obstipation, biliary colic, cholecystitis, cholelithiasis, John-Jhon Darci, hepatitis, pancreatitis, common bile duct obstruction, cholangitis, bile leak, splenic trauma, splenic rupture, splenic infarction,  splenic abscess, abdominal wall hematoma, abdominal wall abscess, intra-abdominal abscess, ascites, spontaneous bacterial peritonitis, hernia, UTI, cystitis, prostatitis, ureterolithiasis, urinary obstruction, AAA, myocardial infarction, pneumonia, cancer, porphyria, DKA, medications, sickle cell, viral syndrome, zoster     Problems Addressed:  Cholecystitis: complicated acute illness or injury    Amount and/or Complexity of Data Reviewed  Labs: ordered. Decision-making details documented in ED Course.  Radiology: ordered.    Risk  Prescription drug management.        Final diagnoses:   Cholecystitis       ED Disposition  ED Disposition       ED Disposition   Send to OR    Condition   --    Comment   --               No follow-up provider specified.       Medication List      No changes were made to your prescriptions during this visit.            Oniel Duncan MD  10/29/23 6888

## 2023-10-29 NOTE — ANESTHESIA PROCEDURE NOTES
Airway  Urgency: elective    Date/Time: 10/29/2023 3:16 PM  Airway not difficult    General Information and Staff    Patient location during procedure: OR  Anesthesiologist: Chasidy Hampton MD    Indications and Patient Condition  Indications for airway management: airway protection    Preoxygenated: yes  MILS maintained throughout  Mask difficulty assessment: 1 - vent by mask    Final Airway Details  Final airway type: endotracheal airway      Successful airway: ETT  Cuffed: yes   Successful intubation technique: direct laryngoscopy  Facilitating devices/methods: intubating stylet  Endotracheal tube insertion site: oral  Blade: Jones  Blade size: 3  ETT size (mm): 7.0  Cormack-Lehane Classification: grade I - full view of glottis  Placement verified by: chest auscultation and capnometry   Cuff volume (mL): 6  Measured from: lips  ETT/EBT  to lips (cm): 21  Number of attempts at approach: 2  Assessment: lips, teeth, and gum same as pre-op and atraumatic intubation

## 2023-10-29 NOTE — OP NOTE
PREOPERATIVE DIAGNOSIS:  Cholelithiasis with acute cholecystitis    POSTOPERATIVE DIAGNOSIS (FINDINGS):  Same    PROCEDURE:  Laparoscopic cholecystectomy with cholangiogram    SURGEON:  Aaron Scott MD    ASSISTANT:  Crista Macdonald was responsible for performing the following activities: suction, irrigation, suturing, closing, retraction, camera holding, and placing dressing, and their skilled assistance was necessary for the success of this case.    ANESTHESIA:  General    EBL:  Minimal    SPECIMEN(S):  Gallbladder    DESCRIPTION:  Supine position. General anesthesia. Prepped and draped, usual sterile manner.    0.5% Marcaine with epinephrine infiltrated in all incision sites. Small periumbilical incision made, Veress needle inserted with upward traction on abdominal wall. Abdomen insufflated to 15 mm Hg pressure and 5 mm Optiview trocar inserted followed by 10 mm subxiphoid and 5 mm right subcostal trocars.    Gallbladder was tense with gallbladder wall edema.  Gallbladder was aspirated revealing hydrops.  Gallbladder was then grasped and elevated. Cystic duct dissected and clipped once distally. Cholangiogram catheter inserted and cholangiogram demonstrated prompt filling of duodenum, no filling defects. Cystic duct clipped twice proximally and divided. Cystic artery clipped twice proximally, once distally and divided. Gallbladder removed from the liver bed with electrocautery, placed in an Endo Catch bag, and removed through subxiphoid trocar site. Liver bed copiously irrigated and aspirated with good hemostasis noted. CO2 released, fascia of subxiphoid trocar site closed with 0-Vicryl, skin edges 5-0 Vicryl subcuticular suture.  Exofin applied.    Tolerated well.  Stable to PACU.    Aaron Scott M.D.

## 2023-10-31 LAB
LAB AP CASE REPORT: NORMAL
PATH REPORT.FINAL DX SPEC: NORMAL
PATH REPORT.GROSS SPEC: NORMAL

## 2023-11-09 ENCOUNTER — OFFICE VISIT (OUTPATIENT)
Dept: SURGERY | Facility: CLINIC | Age: 28
End: 2023-11-09
Payer: MEDICAID

## 2023-11-09 VITALS
DIASTOLIC BLOOD PRESSURE: 78 MMHG | SYSTOLIC BLOOD PRESSURE: 114 MMHG | BODY MASS INDEX: 31.08 KG/M2 | HEIGHT: 66 IN | WEIGHT: 193.4 LBS

## 2023-11-09 DIAGNOSIS — K80.00 ACUTE CHOLECYSTITIS DUE TO BILIARY CALCULUS: Primary | ICD-10-CM

## 2023-11-09 PROCEDURE — 99024 POSTOP FOLLOW-UP VISIT: CPT

## 2023-11-09 PROCEDURE — 1160F RVW MEDS BY RX/DR IN RCRD: CPT

## 2023-11-09 PROCEDURE — 1159F MED LIST DOCD IN RCRD: CPT

## 2023-11-10 NOTE — PROGRESS NOTES
CC: post op    History of Presenting Illness:   28 year old female who presents today for a post-operative appointment. She recently underwent a laparoscopic cholecystectomy. Denies any fevers or chills. Reports incisional discomfort at her subxiphoid incision. Denies any nausea or vomiting. States her abdominal pain prior to surgery has resolved. Reports some constipation, this is improving. Overall, she is doing well.     Surgical History:   Laparoscopic cholecystectomy with cholangiogram 10/29/2023 Dr. Scott    Pathology:   Final Diagnosis   1.  Gallbladder, cholecystectomy:               - Chronic cholecystitis, cholelithiasis and cholesterolosis.     Review of Systems:  Constitutional: negative for fevers or chills  Respiratory: negative for SOB, cough, hemoptysis or wheezing  Cardiovascular: negative for chest pain, palpitations or peripheral edema  Gastrointestinal: positive for incisional pain, negative for abdominal pain, nausea, vomiting, diarrhea, constipation     Physical Exam:  BMI: 31.23   General: alert and oriented, appropriate, no acute distress  Eyes: No scleral icterus, extraocular movements are intact  Respiratory: There is good bilateral chest expansion, no use of accessory muscles is noted  Cardiovascular: No jugular venous distention or peripheral edema is seen  Gastrointestinal: soft, non-tender, incisions healing, no surrounding erythema, warmth, or drainage, residual surgical glue present    Assessment and Plan:   28-year-old female status post laparoscopic cholecystectomy with IOC. Overall, she is doing well. Her incisions are healing nicely. Pathology reviewed and benign.  Discussed stool softeners and MiraLAX as needed for bowel regimen.  Activity restrictions discussed. She knows to call the office with any questions or concerns. She can follow-up in the office as needed.     Adenike Saini PA-C    [facility] - General Surgery   [address] [facility]  [address]  [place] [address]    [address], [address]  [place] [id number]    Office: [phone]  Fax: [phone]

## 2023-12-12 ENCOUNTER — APPOINTMENT (OUTPATIENT)
Dept: CT IMAGING | Facility: HOSPITAL | Age: 28
End: 2023-12-12
Payer: MEDICAID

## 2023-12-12 ENCOUNTER — HOSPITAL ENCOUNTER (EMERGENCY)
Facility: HOSPITAL | Age: 28
Discharge: HOME | End: 2023-12-12
Attending: EMERGENCY MEDICINE | Admitting: EMERGENCY MEDICINE
Payer: MEDICAID

## 2023-12-12 VITALS
WEIGHT: 196 LBS | BODY MASS INDEX: 31.5 KG/M2 | HEIGHT: 66 IN | RESPIRATION RATE: 20 BRPM | OXYGEN SATURATION: 99 % | TEMPERATURE: 98.2 F | SYSTOLIC BLOOD PRESSURE: 112 MMHG | DIASTOLIC BLOOD PRESSURE: 84 MMHG | HEART RATE: 88 BPM

## 2023-12-12 DIAGNOSIS — R10.13 EPIGASTRIC ABDOMINAL PAIN: Primary | ICD-10-CM

## 2023-12-12 LAB
ALBUMIN SERPL-MCNC: 4.2 G/DL (ref 3.5–5.2)
ALBUMIN/GLOB SERPL: 1.5 G/DL
ALP SERPL-CCNC: 86 U/L (ref 39–117)
ALT SERPL W P-5'-P-CCNC: 17 U/L (ref 1–33)
ANION GAP SERPL CALCULATED.3IONS-SCNC: 12.2 MMOL/L (ref 5–15)
AST SERPL-CCNC: 22 U/L (ref 1–32)
B-HCG UR QL: NEGATIVE
BACTERIA UR QL AUTO: ABNORMAL /HPF
BASOPHILS # BLD AUTO: 0.03 10*3/MM3 (ref 0–0.2)
BASOPHILS NFR BLD AUTO: 0.4 % (ref 0–1.5)
BILIRUB SERPL-MCNC: 0.4 MG/DL (ref 0–1.2)
BILIRUB UR QL STRIP: ABNORMAL
BUN SERPL-MCNC: 9 MG/DL (ref 6–20)
BUN/CREAT SERPL: 12.7 (ref 7–25)
CALCIUM SPEC-SCNC: 8.5 MG/DL (ref 8.6–10.5)
CHLORIDE SERPL-SCNC: 101 MMOL/L (ref 98–107)
CLARITY UR: ABNORMAL
CO2 SERPL-SCNC: 20.8 MMOL/L (ref 22–29)
COLOR UR: ABNORMAL
CREAT SERPL-MCNC: 0.71 MG/DL (ref 0.57–1)
DEPRECATED RDW RBC AUTO: 42.8 FL (ref 37–54)
EGFRCR SERPLBLD CKD-EPI 2021: 120.4 ML/MIN/1.73
EOSINOPHIL # BLD AUTO: 0.03 10*3/MM3 (ref 0–0.4)
EOSINOPHIL NFR BLD AUTO: 0.4 % (ref 0.3–6.2)
ERYTHROCYTE [DISTWIDTH] IN BLOOD BY AUTOMATED COUNT: 13.1 % (ref 12.3–15.4)
GLOBULIN UR ELPH-MCNC: 2.8 GM/DL
GLUCOSE SERPL-MCNC: 105 MG/DL (ref 65–99)
GLUCOSE UR STRIP-MCNC: NEGATIVE MG/DL
HCT VFR BLD AUTO: 38.5 % (ref 34–46.6)
HGB BLD-MCNC: 12.8 G/DL (ref 12–15.9)
HGB UR QL STRIP.AUTO: NEGATIVE
HOLD SPECIMEN: NORMAL
HOLD SPECIMEN: NORMAL
HYALINE CASTS UR QL AUTO: ABNORMAL /LPF
IMM GRANULOCYTES # BLD AUTO: 0.02 10*3/MM3 (ref 0–0.05)
IMM GRANULOCYTES NFR BLD AUTO: 0.3 % (ref 0–0.5)
KETONES UR QL STRIP: ABNORMAL
LEUKOCYTE ESTERASE UR QL STRIP.AUTO: NEGATIVE
LIPASE SERPL-CCNC: 21 U/L (ref 13–60)
LYMPHOCYTES # BLD AUTO: 2.42 10*3/MM3 (ref 0.7–3.1)
LYMPHOCYTES NFR BLD AUTO: 31.1 % (ref 19.6–45.3)
MCH RBC QN AUTO: 29.6 PG (ref 26.6–33)
MCHC RBC AUTO-ENTMCNC: 33.2 G/DL (ref 31.5–35.7)
MCV RBC AUTO: 89.1 FL (ref 79–97)
MONOCYTES # BLD AUTO: 0.65 10*3/MM3 (ref 0.1–0.9)
MONOCYTES NFR BLD AUTO: 8.4 % (ref 5–12)
NEUTROPHILS NFR BLD AUTO: 4.63 10*3/MM3 (ref 1.7–7)
NEUTROPHILS NFR BLD AUTO: 59.4 % (ref 42.7–76)
NITRITE UR QL STRIP: NEGATIVE
NRBC BLD AUTO-RTO: 0 /100 WBC (ref 0–0.2)
PH UR STRIP.AUTO: 6 [PH] (ref 4.5–8)
PLATELET # BLD AUTO: 254 10*3/MM3 (ref 140–450)
PMV BLD AUTO: 11.2 FL (ref 6–12)
POTASSIUM SERPL-SCNC: 3.1 MMOL/L (ref 3.5–5.2)
PROT SERPL-MCNC: 7 G/DL (ref 6–8.5)
PROT UR QL STRIP: ABNORMAL
QT INTERVAL: 401 MS
QTC INTERVAL: 454 MS
RBC # BLD AUTO: 4.32 10*6/MM3 (ref 3.77–5.28)
RBC # UR STRIP: ABNORMAL /HPF
REF LAB TEST METHOD: ABNORMAL
SODIUM SERPL-SCNC: 134 MMOL/L (ref 136–145)
SP GR UR STRIP: 1.03 (ref 1–1.03)
SQUAMOUS #/AREA URNS HPF: ABNORMAL /HPF
TROPONIN T SERPL HS-MCNC: <6 NG/L
UROBILINOGEN UR QL STRIP: ABNORMAL
WBC # UR STRIP: ABNORMAL /HPF
WBC NRBC COR # BLD AUTO: 7.78 10*3/MM3 (ref 3.4–10.8)
WHOLE BLOOD HOLD COAG: NORMAL
WHOLE BLOOD HOLD SPECIMEN: NORMAL

## 2023-12-12 PROCEDURE — 83690 ASSAY OF LIPASE: CPT | Performed by: EMERGENCY MEDICINE

## 2023-12-12 PROCEDURE — 84484 ASSAY OF TROPONIN QUANT: CPT | Performed by: EMERGENCY MEDICINE

## 2023-12-12 PROCEDURE — 81001 URINALYSIS AUTO W/SCOPE: CPT | Performed by: EMERGENCY MEDICINE

## 2023-12-12 PROCEDURE — 93005 ELECTROCARDIOGRAM TRACING: CPT

## 2023-12-12 PROCEDURE — 99285 EMERGENCY DEPT VISIT HI MDM: CPT

## 2023-12-12 PROCEDURE — 80053 COMPREHEN METABOLIC PANEL: CPT | Performed by: EMERGENCY MEDICINE

## 2023-12-12 PROCEDURE — 85025 COMPLETE CBC W/AUTO DIFF WBC: CPT | Performed by: EMERGENCY MEDICINE

## 2023-12-12 PROCEDURE — 74177 CT ABD & PELVIS W/CONTRAST: CPT

## 2023-12-12 PROCEDURE — 25510000001 IOPAMIDOL PER 1 ML: Performed by: EMERGENCY MEDICINE

## 2023-12-12 PROCEDURE — 81025 URINE PREGNANCY TEST: CPT

## 2023-12-12 PROCEDURE — 25810000003 SODIUM CHLORIDE 0.9 % SOLUTION

## 2023-12-12 RX ORDER — SODIUM CHLORIDE 0.9 % (FLUSH) 0.9 %
10 SYRINGE (ML) INJECTION AS NEEDED
Status: DISCONTINUED | OUTPATIENT
Start: 2023-12-12 | End: 2023-12-12 | Stop reason: HOSPADM

## 2023-12-12 RX ORDER — ONDANSETRON 4 MG/1
4 TABLET, ORALLY DISINTEGRATING ORAL EVERY 8 HOURS PRN
Qty: 30 TABLET | Refills: 0 | Status: SHIPPED | OUTPATIENT
Start: 2023-12-12

## 2023-12-12 RX ORDER — DICYCLOMINE HCL 20 MG
20 TABLET ORAL EVERY 8 HOURS PRN
Qty: 30 TABLET | Refills: 0 | Status: SHIPPED | OUTPATIENT
Start: 2023-12-12

## 2023-12-12 RX ORDER — POTASSIUM CHLORIDE 20 MEQ/1
40 TABLET, EXTENDED RELEASE ORAL ONCE
Status: DISCONTINUED | OUTPATIENT
Start: 2023-12-12 | End: 2023-12-12 | Stop reason: HOSPADM

## 2023-12-12 RX ADMIN — IOPAMIDOL 100 ML: 755 INJECTION, SOLUTION INTRAVENOUS at 15:56

## 2023-12-12 RX ADMIN — SODIUM CHLORIDE 1000 ML: 9 INJECTION, SOLUTION INTRAVENOUS at 14:55

## 2023-12-12 NOTE — ED PROVIDER NOTES
EMERGENCY DEPARTMENT ENCOUNTER      Room Number: 07/07    History is provided by the patient, no translation services needed    HPI:    Chief complaint: Abdominal pain    Location: Epigastric    Quality/Severity: Patient describes the pain as a severe sharp, stabbing pain.    Timing/Duration: Since 12 PM today    Modifying Factors: None    Associated Symptoms: Back pain, chest pain, diarrhea    Narrative: Pt is a 28 y.o. female who presents complaining of epigastric abdominal pain since 12 PM today.  She describes the pain as a sharp, stabbing pain.  She advises that the pain radiates through to her back and also radiates up into her chest.  She denies any associated nausea or vomiting.  She states that she has been experiencing diarrhea but took medication to improve her diarrhea today which completely resolved the diarrhea.  She denies any associated shortness of breath or cough.  She has not had any fevers or chills.  She states that approximately 7 weeks ago she had a cholecystectomy at this facility.      PMD: Elena Villatoro APRN    REVIEW OF SYSTEMS  Review of Systems   Constitutional:  Negative for chills and fever.   Eyes:  Negative for photophobia and visual disturbance.   Respiratory:  Negative for cough and shortness of breath.    Cardiovascular:  Positive for chest pain. Negative for leg swelling.   Gastrointestinal:  Positive for abdominal pain and diarrhea. Negative for blood in stool, constipation, nausea and vomiting.   Genitourinary:  Negative for difficulty urinating, dysuria and flank pain.   Musculoskeletal:  Negative for back pain, gait problem and neck pain.   Skin:  Negative for color change, pallor, rash and wound.   Neurological:  Negative for dizziness, syncope, weakness, numbness and headaches.   Psychiatric/Behavioral:  Negative for confusion. The patient is not nervous/anxious.          PAST MEDICAL HISTORY  Active Ambulatory Problems     Diagnosis Date Noted    Acute  cholecystitis due to biliary calculus 10/29/2023     Resolved Ambulatory Problems     Diagnosis Date Noted    Encounter for elective induction of labor 2019    Encounter for induction of labor 2021     Past Medical History:   Diagnosis Date    Asthma     Chlamydia     Cholelithiasis     PONV (postoperative nausea and vomiting)     Substance abuse     Trauma        PAST SURGICAL HISTORY  Past Surgical History:   Procedure Laterality Date    CHOLECYSTECTOMY WITH INTRAOPERATIVE CHOLANGIOGRAM N/A 10/29/2023    Procedure: CHOLECYSTECTOMY LAPAROSCOPIC INTRAOPERATIVE CHOLANGIOGRAM;  Surgeon: Aaron Scott MD;  Location: [facility];  Service: General;  Laterality: N/A;    FRACTURE SURGERY Bilateral 2004    elbows       FAMILY HISTORY  Family History   Problem Relation Age of Onset    Obesity Mother     Depression Mother     Heart disease Father     Arthritis Father     Stroke Father          from a stroke    Liver disease Father     Early death Father     Kidney disease Father     Obesity Maternal Aunt     Obesity Maternal Uncle     Heart disease Maternal Grandmother     Obesity Maternal Grandmother        SOCIAL HISTORY  Social History     Socioeconomic History    Marital status:    Tobacco Use    Smoking status: Every Day     Types: Electronic Cigarette    Smokeless tobacco: Never   Vaping Use    Vaping Use: Every day    Substances: Nicotine, CBD    Devices: Disposable   Substance and Sexual Activity    Alcohol use: No    Drug use: Not Currently     Types: Marijuana     Comment: hx. quit 21    Sexual activity: Yes     Partners: Male     Birth control/protection: Natural family planning/Rhythm       ALLERGIES  Ibuprofen      Current Facility-Administered Medications:     potassium chloride (K-DUR,KLOR-CON) CR tablet 40 mEq, 40 mEq, Oral, Once, Kaylie Pascual PA-C    sodium chloride 0.9 % flush 10 mL, 10 mL, Intravenous, PRN, Rubén Thompson MD    Insert Peripheral IV, , , Once **AND**  sodium chloride 0.9 % flush 10 mL, 10 mL, Intravenous, PRN, Kaylie Pascual PA-C    Current Outpatient Medications:     dicyclomine (BENTYL) 20 MG tablet, Take 1 tablet by mouth Every 8 (Eight) Hours As Needed for Abdominal Cramping., Disp: 30 tablet, Rfl: 0    ondansetron ODT (ZOFRAN-ODT) 4 MG disintegrating tablet, Place 1 tablet on the tongue Every 8 (Eight) Hours As Needed for Nausea or Vomiting., Disp: 30 tablet, Rfl: 0    PHYSICAL EXAM  ED Triage Vitals [12/12/23 1401]   Temp Heart Rate Resp BP SpO2   98.2 °F (36.8 °C) 99 20 118/72 99 %      Temp src Heart Rate Source Patient Position BP Location FiO2 (%)   -- Monitor Sitting Right arm --       Physical Exam  Vitals and nursing note reviewed.   Constitutional:       General: She is not in acute distress.     Appearance: She is well-developed. She is not ill-appearing, toxic-appearing or diaphoretic.   HENT:      Head: Normocephalic and atraumatic.   Eyes:      General: No scleral icterus.     Extraocular Movements: Extraocular movements intact.      Conjunctiva/sclera: Conjunctivae normal.      Pupils: Pupils are equal, round, and reactive to light.   Cardiovascular:      Rate and Rhythm: Normal rate and regular rhythm.      Heart sounds: Normal heart sounds.      No friction rub.   Pulmonary:      Effort: Pulmonary effort is normal. No respiratory distress.      Breath sounds: Normal breath sounds. No stridor. No wheezing, rhonchi or rales.   Chest:      Chest wall: No tenderness.   Abdominal:      General: Bowel sounds are normal. There is no distension.      Palpations: Abdomen is soft.      Tenderness: There is abdominal tenderness in the right upper quadrant, epigastric area and left upper quadrant. There is no guarding or rebound. Negative signs include Christian's sign, Rovsing's sign and McBurney's sign.   Musculoskeletal:         General: Normal range of motion.      Cervical back: Normal range of motion and neck supple.   Skin:     General: Skin is warm  and dry.      Coloration: Skin is not cyanotic, jaundiced, mottled or pale.      Findings: No erythema or rash.   Neurological:      Mental Status: She is alert and oriented to person, place, and time.   Psychiatric:         Mood and Affect: Mood and affect normal.           LAB RESULTS  Lab Results (last 24 hours)       Procedure Component Value Units Date/Time    CBC & Differential [[id number]]  (Normal) Collected: 12/12/23 1435    Specimen: Blood Updated: 12/12/23 1459    Narrative:      The following orders were created for panel order CBC & Differential.  Procedure                               Abnormality         Status                     ---------                               -----------         ------                     CBC Auto Differential[[id number]]        Normal              Final result                 Please view results for these tests on the individual orders.    Comprehensive Metabolic Panel [[id number]]  (Abnormal) Collected: 12/12/23 1435    Specimen: Blood Updated: 12/12/23 1515     Glucose 105 mg/dL      BUN 9 mg/dL      Creatinine 0.71 mg/dL      Sodium 134 mmol/L      Potassium 3.1 mmol/L      Chloride 101 mmol/L      CO2 20.8 mmol/L      Calcium 8.5 mg/dL      Total Protein 7.0 g/dL      Albumin 4.2 g/dL      ALT (SGPT) 17 U/L      AST (SGOT) 22 U/L      Alkaline Phosphatase 86 U/L      Total Bilirubin 0.4 mg/dL      Globulin 2.8 gm/dL      A/G Ratio 1.5 g/dL      BUN/Creatinine Ratio 12.7     Anion Gap 12.2 mmol/L      eGFR 120.4 mL/min/1.73     Narrative:      GFR Normal >60  Chronic Kidney Disease <60  Kidney Failure <15      Lipase [[id number]]  (Normal) Collected: 12/12/23 1435    Specimen: Blood Updated: 12/12/23 1515     Lipase 21 U/L     Single High Sensitivity Troponin T [[id number]]  (Normal) Collected: 12/12/23 1435    Specimen: Blood Updated: 12/12/23 1515     HS Troponin T <6 ng/L     Narrative:      High Sensitive Troponin T Reference Range:  <14.0 ng/L- Negative Female for  AMI  <22.0 ng/L- Negative Male for AMI  >=14 - Abnormal Female indicating possible myocardial injury.  >=22 - Abnormal Male indicating possible myocardial injury.   Clinicians would have to utilize clinical acumen, EKG, Troponin, and serial changes to determine if it is an Acute Myocardial Infarction or myocardial injury due to an underlying chronic condition.         CBC Auto Differential [[id number]]  (Normal) Collected: 12/12/23 1435    Specimen: Blood Updated: 12/12/23 1459     WBC 7.78 10*3/mm3      RBC 4.32 10*6/mm3      Hemoglobin 12.8 g/dL      Hematocrit 38.5 %      MCV 89.1 fL      MCH 29.6 pg      MCHC 33.2 g/dL      RDW 13.1 %      RDW-SD 42.8 fl      MPV 11.2 fL      Platelets 254 10*3/mm3      Neutrophil % 59.4 %      Lymphocyte % 31.1 %      Monocyte % 8.4 %      Eosinophil % 0.4 %      Basophil % 0.4 %      Immature Grans % 0.3 %      Neutrophils, Absolute 4.63 10*3/mm3      Lymphocytes, Absolute 2.42 10*3/mm3      Monocytes, Absolute 0.65 10*3/mm3      Eosinophils, Absolute 0.03 10*3/mm3      Basophils, Absolute 0.03 10*3/mm3      Immature Grans, Absolute 0.02 10*3/mm3      nRBC 0.0 /100 WBC     Urinalysis With Microscopic If Indicated (No Culture) - Urine, Clean Catch [[id number]]  (Abnormal) Collected: 12/12/23 1444    Specimen: Urine, Clean Catch Updated: 12/12/23 1504     Color, UA Dark Yellow     Appearance, UA Slightly Cloudy     pH, UA 6.0     Specific Gravity, UA 1.033     Comment: Result obtained by Refractometer        Glucose, UA Negative     Ketones, UA >=160 mg/dL (4+)     Bilirubin, UA Small (1+)     Blood, UA Negative     Protein, UA 30 mg/dL (1+)     Leuk Esterase, UA Negative     Nitrite, UA Negative     Urobilinogen, UA 4.0 E.U./dL    Pregnancy, Urine - Urine, Clean Catch [[id number]]  (Normal) Collected: 12/12/23 1444    Specimen: Urine, Clean Catch Updated: 12/12/23 1505     HCG, Urine QL Negative    Urinalysis, Microscopic Only - Urine, Clean Catch [[id number]]  (Abnormal)  Collected: 12/12/23 1444    Specimen: Urine, Clean Catch Updated: 12/12/23 1511     RBC, UA 0-2 /HPF      WBC, UA 0-2 /HPF      Bacteria, UA 1+ /HPF      Squamous Epithelial Cells, UA 7-12 /HPF      Hyaline Casts, UA None Seen /LPF      Methodology Manual Light Microscopy              I ordered the above labs and reviewed the results    RADIOLOGY  CT Abdomen Pelvis With Contrast    Result Date: 12/12/2023  CT ABDOMEN AND PELVIS WITH CONTRAST 12/12/2023 HISTORY: 28-year-old female with epigastric and abdominal pain. Nausea and vomiting that began last night. History of cholecystectomy. TECHNIQUE: Contrast-enhanced CT of the abdomen and pelvis was performed. Sagittal and coronal reformats performed. Comparison study 10/29/2023. Radiation dose reduction techniques included automated exposure control or exposure modulation based on body size. Radiation audit for CT and nuclear cardiology exams in the last 12 months: 1. ABDOMEN FINDINGS: Included lung bases are clear. There is no effusion. Normal caliber aorta. The spleen is enlarged measuring 13 cm. The liver is enlarged, measuring 20 cm and there is hepatic steatosis. The adrenal glands are unremarkable and the pancreas is negative. The gallbladder is surgically absent and the liver is otherwise unremarkable. The kidneys are nonobstructed and are otherwise negative. No threshold adenopathy. PELVIS FINDINGS: The bladder is decompressed. No drainable fluid collection. No free fluid in the pelvis. The bowel is nonobstructed. No focal inflammatory change of the bowel and the terminal ileum and appendix are negative. There are nondilated fluid-filled distal small bowel loops and there is mild air and fluid distention of the cecum and proximal ascending colon. Imaging findings supportive of diarrhea state but are otherwise nonspecific. Inguinal canals negative. No suspicious bone lesion.  negative.     1. No clearly acute process in the abdomen or pelvis. No bowel  obstruction drainable fluid collection or focal area of inflammatory change. Constellation of imaging findings supportive of diarrhea state but otherwise nonspecific. 2. Interval cholecystectomy. 3. Hepatosplenomegaly and hepatic steatosis. Signer Name: Juan Soliz MD Signed: 12/12/2023 4:15 PM EST [facility]     I ordered the above radiologic testing and reviewed the results    PROCEDURES  Procedures      PROGRESS AND CONSULTS  ED Course as of 12/12/23 1625   Tue Dec 12, 2023   1450 The patient appears mildly uncomfortable.  She has tenderness across her upper abdomen.  Labs and imaging ordered to evaluate further.  Her vital signs are stable and within normal limits. []   1509 EKG by me nsr, qrs nml, no st elev [BC]   1623 HEART RATE= 77  bpm  RR Interval= 780  ms  OK Interval= 139  ms  P Horizontal Axis= 34  deg  P Front Axis= 23  deg  QRSD Interval= 89  ms  QT Interval= 401  ms  QTcB= 454  ms  QRS Axis= 31  deg  T Wave Axis= 2  deg  - NORMAL ECG -  Sinus rhythm  NO PRIOR TRACING AVAILABLE FOR COMPARISON   [AH]   1623 Results discussed in depth with the patient.  She expressed understanding.  She states that she is comfortable being discharged home at this time. Follow-up instructions given.  Return to the ED instructions given. [AH]      ED Course User Index  [AH] Kaylie Pascual PA-C  [BC] Jesu Vega MD           MEDICAL DECISION MAKING    MDM       My differential diagnosis for abdominal pain includes but is not limited to:  Gastritis, gastroenteritis, peptic ulcer disease, GERD, esophageal perforation, acute appendicitis, mesenteric adenitis, Meckel’s diverticulum, epiploic appendagitis, diverticulitis, colon cancer, ulcerative colitis, Crohn’s disease, intussusception, small bowel obstruction, adhesions, ischemic bowel, perforated viscus, ileus, obstipation, biliary colic, cholecystitis, cholelithiasis, John-Jhon Darci, hepatitis, pancreatitis, common bile duct  obstruction, cholangitis, bile leak, splenic trauma, splenic rupture, splenic infarction, splenic abscess, abdominal abscess, ascites, spontaneous bacterial peritonitis, hernia, UTI, cystitis,ureterolithiasis, urinary obstruction, ovarian cyst, torsion, pregnancy, ectopic pregnancy, PID, pelvic abscess, mittelschmerz, endometriosis, AAA, myocardial infarction, pneumonia, cancer, porphyria, DKA, medications, sickle cell, viral syndrome, zoster   DIAGNOSIS  Final diagnoses:   Epigastric abdominal pain       Latest Documented Vital Signs:  As of 16:25 EST  BP- 112/84 HR- 88 Temp- 98.2 °F (36.8 °C) O2 sat- 99%    DISPOSITION  Pt discharged    Discussed pertinent findings with the patient/family.  Patient/Family voiced understanding of need to follow-up for recheck and further testing as needed.  Return to the Emergency Department warnings were given.         Medication List        New Prescriptions      dicyclomine 20 MG tablet  Commonly known as: BENTYL  Take 1 tablet by mouth Every 8 (Eight) Hours As Needed for Abdominal Cramping.     ondansetron ODT 4 MG disintegrating tablet  Commonly known as: ZOFRAN-ODT  Place 1 tablet on the tongue Every 8 (Eight) Hours As Needed for Nausea or Vomiting.               Where to Get Your Medications        These medications were sent to [facility] - [place], [place] - [address] - [phone]  - [phone]   [address], [place] [address]      Phone: [phone]   dicyclomine 20 MG tablet  ondansetron ODT 4 MG disintegrating tablet              Follow-up Information       Elena Villatoro, YA. Call today.    Specialties: Family Medicine, Internal Medicine, Emergency Medicine  Why: to schedule follow up  Contact information:  [address]  [place] [id number] [phone]               Go to  [facility] EMERGENCY DEPARTMENT.    Specialty: Emergency Medicine  Why: If symptoms worsen  Contact information:  [address]  [place] [id number] [phone]                             Dictated utilizing Lianeon dictation     Kaylie Pascual PA-C  12/12/23 8898

## 2023-12-12 NOTE — ED NOTES
Patient to ER by EMS for diffused abd pain that started at 1230 today, patient states she has episodes of N/V, patient also states that her pain is intermittent and is better with laying on her side. Per EMS she was given 50mcg of Fentanyl enroute. Patient denies any fevers or issues with bowel or urinary pattern

## 2023-12-21 ENCOUNTER — OFFICE VISIT (OUTPATIENT)
Dept: INTERNAL MEDICINE | Facility: CLINIC | Age: 28
End: 2023-12-21
Payer: MEDICAID

## 2023-12-21 VITALS
DIASTOLIC BLOOD PRESSURE: 74 MMHG | OXYGEN SATURATION: 99 % | SYSTOLIC BLOOD PRESSURE: 110 MMHG | WEIGHT: 195 LBS | HEIGHT: 66 IN | BODY MASS INDEX: 31.34 KG/M2 | HEART RATE: 67 BPM | TEMPERATURE: 97.5 F

## 2023-12-21 DIAGNOSIS — Z09 ENCOUNTER FOR EXAMINATION FOLLOWING TREATMENT AT HOSPITAL: Primary | ICD-10-CM

## 2023-12-21 DIAGNOSIS — B97.89 VIRAL RESPIRATORY ILLNESS: ICD-10-CM

## 2023-12-21 DIAGNOSIS — A08.4 VIRAL GASTROENTERITIS: ICD-10-CM

## 2023-12-21 DIAGNOSIS — J98.8 VIRAL RESPIRATORY ILLNESS: ICD-10-CM

## 2023-12-21 LAB
EXPIRATION DATE: NORMAL
INTERNAL CONTROL: NORMAL
Lab: NORMAL
SARS-COV-2 AG UPPER RESP QL IA.RAPID: NOT DETECTED

## 2023-12-21 NOTE — PROGRESS NOTES
Subjective   Jewell Schmidt is a 28 y.o. female presenting today for follow up of   Chief Complaint   Patient presents with    Hospital Follow Up Visit    Earache     Left ear       Outpatient Medications Marked as Taking for the 12/21/23 encounter (Office Visit) with Elena Villatoro APRN   Medication Sig Dispense Refill    dicyclomine (BENTYL) 20 MG tablet Take 1 tablet by mouth Every 8 (Eight) Hours As Needed for Abdominal Cramping. 30 tablet 0    ondansetron ODT (ZOFRAN-ODT) 4 MG disintegrating tablet Place 1 tablet on the tongue Every 8 (Eight) Hours As Needed for Nausea or Vomiting. 30 tablet 0       ED with Rubén Thompson MD (12/12/2023)   Chief complaint: Abdominal pain     Location: Epigastric     Quality/Severity: Patient describes the pain as a severe sharp, stabbing pain.     Timing/Duration: Since 12 PM today     Modifying Factors: None     Associated Symptoms: Back pain, chest pain, diarrhea     Narrative: Pt is a 28 y.o. female who presents complaining of epigastric abdominal pain since 12 PM today.  She describes the pain as a sharp, stabbing pain.  She advises that the pain radiates through to her back and also radiates up into her chest.  She denies any associated nausea or vomiting.  She states that she has been experiencing diarrhea but took medication to improve her diarrhea today which completely resolved the diarrhea.  She denies any associated shortness of breath or cough.  She has not had any fevers or chills.  She states that approximately 7 weeks ago she had a cholecystectomy at this facility.      Prior to presentation to ER, she had been having severe persistent n/v. Several family members had similar S&S. Her S&S resolved w/in a few hours of leaving the ER. She continues to note dry mouth. She continues to take Zofran b/c if she does not she has no appetite.    Now she feels like she has a head cold. This started this week. She notes head congestion, nasal drainage,  "productive cough, and L ear pain. No home COVID test. No family members w/ head cold symptoms. She has taken an OTC allergy med.      The following portions of the patient's history were reviewed and updated as appropriate: allergies, current medications, past family history, past medical history, past social history, past surgical history and problem list.    Review of Systems   Constitutional:  Negative for chills and fever.   HENT:  Positive for congestion, ear pain and rhinorrhea.    Respiratory:  Positive for cough.        Objective   Vitals:    12/21/23 1030   BP: 110/74   BP Location: Left arm   Patient Position: Sitting   Cuff Size: Adult   Pulse: 67   Temp: 97.5 °F (36.4 °C)   TempSrc: Infrared   SpO2: 99%   Weight: 88.5 kg (195 lb)   Height: 167.6 cm (65.98\")       BP Readings from Last 3 Encounters:   12/21/23 110/74   12/12/23 112/84   11/09/23 114/78        Wt Readings from Last 3 Encounters:   12/21/23 88.5 kg (195 lb)   12/12/23 88.9 kg (196 lb)   11/09/23 87.7 kg (193 lb 6.4 oz)        Body mass index is 31.49 kg/m².  Nursing notes and vitals reviewed.    Physical Exam  Constitutional:       General: She is not in acute distress.     Appearance: Normal appearance. She is well-developed.   HENT:      Head: Normocephalic.      Right Ear: Hearing, tympanic membrane, ear canal and external ear normal.      Left Ear: Ear canal and external ear normal. A middle ear effusion is present. Tympanic membrane is not erythematous or bulging.      Nose: Mucosal edema and rhinorrhea present.      Right Sinus: No maxillary sinus tenderness or frontal sinus tenderness.      Left Sinus: No maxillary sinus tenderness or frontal sinus tenderness.      Mouth/Throat:      Mouth: Mucous membranes are moist.      Pharynx: Oropharynx is clear. Uvula midline.   Eyes:      General: Lids are normal.      Extraocular Movements: Extraocular movements intact.      Conjunctiva/sclera: Conjunctivae normal.      Pupils: Pupils are " equal, round, and reactive to light.   Neck:      Thyroid: No thyroid mass or thyromegaly.   Cardiovascular:      Rate and Rhythm: Regular rhythm.      Pulses: Normal pulses.      Heart sounds: S1 normal and S2 normal. No murmur heard.     No friction rub. No gallop.   Pulmonary:      Effort: Pulmonary effort is normal.      Breath sounds: Normal breath sounds. No wheezing, rhonchi or rales.   Abdominal:      General: Bowel sounds are normal. There is no distension.      Palpations: Abdomen is soft. There is no hepatomegaly, splenomegaly or mass.      Tenderness: There is no abdominal tenderness. There is no guarding.      Hernia: No hernia is present.   Musculoskeletal:         General: No deformity. Normal range of motion.      Cervical back: Normal range of motion and neck supple.   Lymphadenopathy:      Cervical: No cervical adenopathy.   Skin:     General: Skin is warm and dry.      Findings: No lesion or rash.      Comments: Surgical incisions well-healed and w/I erythema or edema.   Neurological:      General: No focal deficit present.      Mental Status: She is alert and oriented to person, place, and time.      Cranial Nerves: No cranial nerve deficit.      Sensory: No sensory deficit.      Motor: Motor function is intact.      Coordination: Coordination is intact.      Gait: Gait normal.      Deep Tendon Reflexes: Reflexes are normal and symmetric.   Psychiatric:         Attention and Perception: She is attentive.         Mood and Affect: Mood and affect normal.         Speech: Speech normal.         Behavior: Behavior normal.         Thought Content: Thought content normal.         Recent Results (from the past 672 hour(s))   Comprehensive Metabolic Panel    Collection Time: 12/12/23  2:35 PM    Specimen: Blood   Result Value Ref Range    Glucose 105 (H) 65 - 99 mg/dL    BUN 9 6 - 20 mg/dL    Creatinine 0.71 0.57 - 1.00 mg/dL    Sodium 134 (L) 136 - 145 mmol/L    Potassium 3.1 (L) 3.5 - 5.2 mmol/L     Chloride 101 98 - 107 mmol/L    CO2 20.8 (L) 22.0 - 29.0 mmol/L    Calcium 8.5 (L) 8.6 - 10.5 mg/dL    Total Protein 7.0 6.0 - 8.5 g/dL    Albumin 4.2 3.5 - 5.2 g/dL    ALT (SGPT) 17 1 - 33 U/L    AST (SGOT) 22 1 - 32 U/L    Alkaline Phosphatase 86 39 - 117 U/L    Total Bilirubin 0.4 0.0 - 1.2 mg/dL    Globulin 2.8 gm/dL    A/G Ratio 1.5 g/dL    BUN/Creatinine Ratio 12.7 7.0 - 25.0    Anion Gap 12.2 5.0 - 15.0 mmol/L    eGFR 120.4 >60.0 mL/min/1.73   Lipase    Collection Time: 12/12/23  2:35 PM    Specimen: Blood   Result Value Ref Range    Lipase 21 13 - 60 U/L   Single High Sensitivity Troponin T    Collection Time: 12/12/23  2:35 PM    Specimen: Blood   Result Value Ref Range    HS Troponin T <6 <14 ng/L   Green Top (Gel)    Collection Time: 12/12/23  2:35 PM   Result Value Ref Range    Extra Tube Hold for add-ons.    Lavender Top    Collection Time: 12/12/23  2:35 PM   Result Value Ref Range    Extra Tube hold for add-on    Gold Top - SST    Collection Time: 12/12/23  2:35 PM   Result Value Ref Range    Extra Tube Hold for add-ons.    Light Blue Top    Collection Time: 12/12/23  2:35 PM   Result Value Ref Range    Extra Tube Hold for add-ons.    CBC Auto Differential    Collection Time: 12/12/23  2:35 PM    Specimen: Blood   Result Value Ref Range    WBC 7.78 3.40 - 10.80 10*3/mm3    RBC 4.32 3.77 - 5.28 10*6/mm3    Hemoglobin 12.8 12.0 - 15.9 g/dL    Hematocrit 38.5 34.0 - 46.6 %    MCV 89.1 79.0 - 97.0 fL    MCH 29.6 26.6 - 33.0 pg    MCHC 33.2 31.5 - 35.7 g/dL    RDW 13.1 12.3 - 15.4 %    RDW-SD 42.8 37.0 - 54.0 fl    MPV 11.2 6.0 - 12.0 fL    Platelets 254 140 - 450 10*3/mm3    Neutrophil % 59.4 42.7 - 76.0 %    Lymphocyte % 31.1 19.6 - 45.3 %    Monocyte % 8.4 5.0 - 12.0 %    Eosinophil % 0.4 0.3 - 6.2 %    Basophil % 0.4 0.0 - 1.5 %    Immature Grans % 0.3 0.0 - 0.5 %    Neutrophils, Absolute 4.63 1.70 - 7.00 10*3/mm3    Lymphocytes, Absolute 2.42 0.70 - 3.10 10*3/mm3    Monocytes, Absolute 0.65 0.10 -  0.90 10*3/mm3    Eosinophils, Absolute 0.03 0.00 - 0.40 10*3/mm3    Basophils, Absolute 0.03 0.00 - 0.20 10*3/mm3    Immature Grans, Absolute 0.02 0.00 - 0.05 10*3/mm3    nRBC 0.0 0.0 - 0.2 /100 WBC   Urinalysis With Microscopic If Indicated (No Culture) - Urine, Clean Catch    Collection Time: 12/12/23  2:44 PM    Specimen: Urine, Clean Catch   Result Value Ref Range    Color, UA Dark Yellow (A) Yellow, Straw    Appearance, UA Slightly Cloudy (A) Clear    pH, UA 6.0 4.5 - 8.0    Specific Gravity, UA 1.033 (H) 1.003 - 1.030    Glucose, UA Negative Negative    Ketones, UA >=160 mg/dL (4+) (A) Negative    Bilirubin, UA Small (1+) (A) Negative    Blood, UA Negative Negative    Protein, UA 30 mg/dL (1+) (A) Negative    Leuk Esterase, UA Negative Negative    Nitrite, UA Negative Negative    Urobilinogen, UA 4.0 E.U./dL (A) 0.2 - 1.0 E.U./dL   Pregnancy, Urine - Urine, Clean Catch    Collection Time: 12/12/23  2:44 PM    Specimen: Urine, Clean Catch   Result Value Ref Range    HCG, Urine QL Negative Negative   Urinalysis, Microscopic Only - Urine, Clean Catch    Collection Time: 12/12/23  2:44 PM    Specimen: Urine, Clean Catch   Result Value Ref Range    RBC, UA 0-2 None Seen, 0-2 /HPF    WBC, UA 0-2 None Seen, 0-2 /HPF    Bacteria, UA 1+ (A) None Seen /HPF    Squamous Epithelial Cells, UA 7-12 (A) None Seen, 0-2 /HPF    Hyaline Casts, UA None Seen None Seen /LPF    Methodology Manual Light Microscopy    ECG 12 Lead Chest Pain    Collection Time: 12/12/23  3:07 PM   Result Value Ref Range    QT Interval 401 ms    QTC Interval 454 ms     Recent Results (from the past 24 hour(s))   POCT VERITOR SARS-CoV-2 Antigen    Collection Time: 12/21/23 11:22 AM    Specimen: Nasopharynx; Swab   Result Value Ref Range    SARS Antigen Not Detected Not Detected, Presumptive Negative    Internal Control Passed Passed    Lot Number [id number]     Expiration Date 6/11/24        CT Abdomen Pelvis With Contrast    Result Date: 12/12/2023  1.  No clearly acute process in the abdomen or pelvis. No bowel obstruction drainable fluid collection or focal area of inflammatory change. Constellation of imaging findings supportive of diarrhea state but otherwise nonspecific. 2. Interval cholecystectomy. 3. Hepatosplenomegaly and hepatic steatosis. Signer Name: Juan Soliz MD Signed: 12/12/2023 4:15 PM EST [facility]    CT Abdomen Pelvis Without Contrast    Result Date: 10/29/2023  1. The gallbladder demonstrates multiple small gallstones in the dependent portion of the gallbladder. The gallbladder is mildly distended.  2. No evidence of renal or ureteral calculi. No hydronephrosis. No evidence of appendicitis.   This report was finalized on 10/29/2023 11:58 AM by Dr. Raciel Pierson MD.      XR Chest 2 View    Result Date: 10/29/2023  No acute cardiopulmonary pathology.   This report was finalized on 10/29/2023 11:49 AM by Dr. Raciel Pierson MD.         Assessment & Plan   Diagnoses and all orders for this visit:    1. Encounter for examination following treatment at hospital (Primary)    2. Viral gastroenteritis    3. Viral respiratory illness  -     POCT VERITOR SARS-CoV-2 Antigen        Anticipatory guidance. Discussed supportive care and emergent S&S.  - OTC sudafed  - saline nasal rinses      Medications, including side effects, were discussed with the patient. Patient verbalized understanding.  The plan of care was discussed. All questions were answered. Patient verbalized understanding.      Return if symptoms worsen or fail to improve.

## (undated) DEVICE — ENDOPATH XCEL BLADELESS TROCARS WITH STABILITY SLEEVES: Brand: ENDOPATH XCEL

## (undated) DEVICE — PK LAP GEN 90

## (undated) DEVICE — ADHS SKIN SURG TISS VISC PREMIERPRO EXOFIN HI/VISC FAST/DRY

## (undated) DEVICE — ENDOPATH PNEUMONEEDLE INSUFFLATION NEEDLES WITH LUER LOCK CONNECTORS 120MM: Brand: ENDOPATH

## (undated) DEVICE — SUT VIC 5/0 PS2 18IN J495H

## (undated) DEVICE — CATH CHOLANG 4.5F18IN BRGNDY

## (undated) DEVICE — GOWN ,SIRUS,NONREINFORCED SMALL: Brand: MEDLINE

## (undated) DEVICE — CATH IV INSYTE AUTOGARD 14G 1 1/2IN ORNG

## (undated) DEVICE — EXTENSION SET, MALE LUER LOCK ADAPTER WITH RETRACTABLE COLLAR

## (undated) DEVICE — SUT VIC 0 TN 27IN DYED JTN0G

## (undated) DEVICE — DISPOSABLE MONOPOLAR ENDOSCOPIC CORD 10 FT. (3M): Brand: KIRWAN

## (undated) DEVICE — CONTAINER,SPECIMEN,OR STERILE,4OZ: Brand: MEDLINE

## (undated) DEVICE — SYR LUERLOK 30CC

## (undated) DEVICE — GLV SURG PREMIERPRO ORTHO LTX PF SZ7.5 BRN

## (undated) DEVICE — PATIENT RETURN ELECTRODE, SINGLE-USE, CONTACT QUALITY MONITORING, ADULT, WITH 9FT CORD, FOR PATIENTS WEIGING OVER 33LBS. (15KG): Brand: MEGADYNE

## (undated) DEVICE — SKIN PREP TRAY W/CHG: Brand: MEDLINE INDUSTRIES, INC.

## (undated) DEVICE — LAPAROSCOPIC SCOPE WARMER: Brand: DEROYAL

## (undated) DEVICE — SYR LUERLOK 20CC BX/50

## (undated) DEVICE — ENDOCUT SCISSOR TIP, DISPOSABLE: Brand: RENEW

## (undated) DEVICE — ENDOPATH XCEL UNIVERSAL TROCAR STABLILITY SLEEVES: Brand: ENDOPATH XCEL

## (undated) DEVICE — SOL NACL 0.9PCT 1000ML

## (undated) DEVICE — GLV SURG BIOGEL LTX PF 6

## (undated) DEVICE — STPCK 3WY D201 DISCOFIX

## (undated) DEVICE — ENDOPOUCH RETRIEVER SPECIMEN RETRIEVAL BAGS: Brand: ENDOPOUCH RETRIEVER

## (undated) DEVICE — 2, DISPOSABLE SUCTION/IRRIGATOR WITH DISPOSABLE TIP: Brand: STRYKEFLOW